# Patient Record
Sex: FEMALE | Race: WHITE | Employment: OTHER | URBAN - METROPOLITAN AREA
[De-identification: names, ages, dates, MRNs, and addresses within clinical notes are randomized per-mention and may not be internally consistent; named-entity substitution may affect disease eponyms.]

---

## 2020-09-28 ENCOUNTER — APPOINTMENT (OUTPATIENT)
Dept: GENERAL RADIOLOGY | Age: 60
DRG: 552 | End: 2020-09-28
Payer: COMMERCIAL

## 2020-09-28 ENCOUNTER — HOSPITAL ENCOUNTER (INPATIENT)
Age: 60
LOS: 2 days | Discharge: HOME | DRG: 552 | End: 2020-09-30
Attending: EMERGENCY MEDICINE | Admitting: INTERNAL MEDICINE
Payer: COMMERCIAL

## 2020-09-28 ENCOUNTER — APPOINTMENT (OUTPATIENT)
Dept: CT IMAGING | Age: 60
DRG: 552 | End: 2020-09-28
Payer: COMMERCIAL

## 2020-09-28 ENCOUNTER — APPOINTMENT (OUTPATIENT)
Dept: MRI IMAGING | Age: 60
DRG: 552 | End: 2020-09-28
Payer: COMMERCIAL

## 2020-09-28 PROBLEM — L03.311 CELLULITIS OF ABDOMINAL WALL: Status: ACTIVE | Noted: 2020-09-28
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
## 2020-09-28 PROBLEM — R25.1 TREMORS OF NERVOUS SYSTEM: Status: ACTIVE | Noted: 2020-09-28

## 2020-09-28 PROBLEM — E78.2 MIXED HYPERLIPIDEMIA: Status: ACTIVE | Noted: 2020-09-28

## 2020-09-28 PROBLEM — M19.90 ARTHRITIS: Status: ACTIVE | Noted: 2020-09-28

## 2020-09-28 PROBLEM — M43.00 SPONDYLOLYSIS: Status: ACTIVE | Noted: 2020-09-28

## 2020-09-28 LAB
A/G RATIO: 1.3 (ref 1.1–2.2)
ALBUMIN SERPL-MCNC: 4.3 G/DL (ref 3.4–5)
ALP BLD-CCNC: 94 U/L (ref 40–129)
ALT SERPL-CCNC: 33 U/L (ref 10–40)
ANION GAP SERPL CALCULATED.3IONS-SCNC: 10 MMOL/L (ref 3–16)
AST SERPL-CCNC: 27 U/L (ref 15–37)
BASOPHILS ABSOLUTE: 0 K/UL (ref 0–0.2)
BASOPHILS RELATIVE PERCENT: 0.8 %
BILIRUB SERPL-MCNC: 0.3 MG/DL (ref 0–1)
BUN BLDV-MCNC: 27 MG/DL (ref 7–20)
CALCIUM SERPL-MCNC: 9.5 MG/DL (ref 8.3–10.6)
CHLORIDE BLD-SCNC: 100 MMOL/L (ref 99–110)
CO2: 29 MMOL/L (ref 21–32)
CREAT SERPL-MCNC: 1 MG/DL (ref 0.6–1.1)
EKG ATRIAL RATE: 70 BPM
EKG DIAGNOSIS: NORMAL
EKG P AXIS: 50 DEGREES
EKG P-R INTERVAL: 182 MS
EKG Q-T INTERVAL: 410 MS
EKG QRS DURATION: 88 MS
EKG QTC CALCULATION (BAZETT): 442 MS
EKG R AXIS: 57 DEGREES
EKG T AXIS: 45 DEGREES
EKG VENTRICULAR RATE: 70 BPM
EOSINOPHILS ABSOLUTE: 0.4 K/UL (ref 0–0.6)
EOSINOPHILS RELATIVE PERCENT: 7.4 %
GFR AFRICAN AMERICAN: >60
GFR NON-AFRICAN AMERICAN: 57
GLOBULIN: 3.2 G/DL
GLUCOSE BLD-MCNC: 103 MG/DL (ref 70–99)
GLUCOSE BLD-MCNC: 142 MG/DL (ref 70–99)
HCT VFR BLD CALC: 41.3 % (ref 36–48)
HEMOGLOBIN: 13.5 G/DL (ref 12–16)
LYMPHOCYTES ABSOLUTE: 1.6 K/UL (ref 1–5.1)
LYMPHOCYTES RELATIVE PERCENT: 30.6 %
MCH RBC QN AUTO: 27.3 PG (ref 26–34)
MCHC RBC AUTO-ENTMCNC: 32.7 G/DL (ref 31–36)
MCV RBC AUTO: 83.5 FL (ref 80–100)
MONOCYTES ABSOLUTE: 0.4 K/UL (ref 0–1.3)
MONOCYTES RELATIVE PERCENT: 7 %
NEUTROPHILS ABSOLUTE: 2.9 K/UL (ref 1.7–7.7)
NEUTROPHILS RELATIVE PERCENT: 54.2 %
PDW BLD-RTO: 14 % (ref 12.4–15.4)
PERFORMED ON: ABNORMAL
PLATELET # BLD: 257 K/UL (ref 135–450)
PMV BLD AUTO: 7.9 FL (ref 5–10.5)
POTASSIUM REFLEX MAGNESIUM: 4.4 MMOL/L (ref 3.5–5.1)
RBC # BLD: 4.94 M/UL (ref 4–5.2)
SODIUM BLD-SCNC: 139 MMOL/L (ref 136–145)
TOTAL PROTEIN: 7.5 G/DL (ref 6.4–8.2)
WBC # BLD: 5.3 K/UL (ref 4–11)

## 2020-09-28 PROCEDURE — 6360000002 HC RX W HCPCS: Performed by: PHYSICIAN ASSISTANT

## 2020-09-28 PROCEDURE — 6370000000 HC RX 637 (ALT 250 FOR IP): Performed by: EMERGENCY MEDICINE

## 2020-09-28 PROCEDURE — 2580000003 HC RX 258: Performed by: NURSE PRACTITIONER

## 2020-09-28 PROCEDURE — 70450 CT HEAD/BRAIN W/O DYE: CPT

## 2020-09-28 PROCEDURE — 93005 ELECTROCARDIOGRAM TRACING: CPT | Performed by: EMERGENCY MEDICINE

## 2020-09-28 PROCEDURE — 72125 CT NECK SPINE W/O DYE: CPT

## 2020-09-28 PROCEDURE — 93010 ELECTROCARDIOGRAM REPORT: CPT | Performed by: INTERNAL MEDICINE

## 2020-09-28 PROCEDURE — 72141 MRI NECK SPINE W/O DYE: CPT

## 2020-09-28 PROCEDURE — 73030 X-RAY EXAM OF SHOULDER: CPT

## 2020-09-28 PROCEDURE — 96375 TX/PRO/DX INJ NEW DRUG ADDON: CPT

## 2020-09-28 PROCEDURE — 72128 CT CHEST SPINE W/O DYE: CPT

## 2020-09-28 PROCEDURE — 2500000003 HC RX 250 WO HCPCS: Performed by: NURSE PRACTITIONER

## 2020-09-28 PROCEDURE — 72131 CT LUMBAR SPINE W/O DYE: CPT

## 2020-09-28 PROCEDURE — 1200000000 HC SEMI PRIVATE

## 2020-09-28 PROCEDURE — 6360000002 HC RX W HCPCS: Performed by: NURSE PRACTITIONER

## 2020-09-28 PROCEDURE — 6370000000 HC RX 637 (ALT 250 FOR IP): Performed by: NURSE PRACTITIONER

## 2020-09-28 PROCEDURE — 85025 COMPLETE CBC W/AUTO DIFF WBC: CPT

## 2020-09-28 PROCEDURE — 72146 MRI CHEST SPINE W/O DYE: CPT

## 2020-09-28 PROCEDURE — 96374 THER/PROPH/DIAG INJ IV PUSH: CPT

## 2020-09-28 PROCEDURE — C9113 INJ PANTOPRAZOLE SODIUM, VIA: HCPCS | Performed by: PHYSICIAN ASSISTANT

## 2020-09-28 PROCEDURE — 99285 EMERGENCY DEPT VISIT HI MDM: CPT

## 2020-09-28 PROCEDURE — 6370000000 HC RX 637 (ALT 250 FOR IP): Performed by: PHYSICIAN ASSISTANT

## 2020-09-28 PROCEDURE — 80053 COMPREHEN METABOLIC PANEL: CPT

## 2020-09-28 RX ORDER — GABAPENTIN 300 MG/1
300 CAPSULE ORAL 2 TIMES DAILY
Status: DISCONTINUED | OUTPATIENT
Start: 2020-09-28 | End: 2020-09-30 | Stop reason: HOSPADM

## 2020-09-28 RX ORDER — PANTOPRAZOLE SODIUM 40 MG/10ML
40 INJECTION, POWDER, LYOPHILIZED, FOR SOLUTION INTRAVENOUS ONCE
Status: COMPLETED | OUTPATIENT
Start: 2020-09-28 | End: 2020-09-28

## 2020-09-28 RX ORDER — MORPHINE SULFATE 2 MG/ML
2 INJECTION, SOLUTION INTRAMUSCULAR; INTRAVENOUS EVERY 4 HOURS PRN
Status: DISCONTINUED | OUTPATIENT
Start: 2020-09-28 | End: 2020-09-30 | Stop reason: HOSPADM

## 2020-09-28 RX ORDER — ANASTROZOLE 1 MG/1
1 TABLET ORAL DAILY
Status: DISCONTINUED | OUTPATIENT
Start: 2020-09-29 | End: 2020-09-30 | Stop reason: HOSPADM

## 2020-09-28 RX ORDER — SODIUM CHLORIDE 0.9 % (FLUSH) 0.9 %
10 SYRINGE (ML) INJECTION PRN
Status: DISCONTINUED | OUTPATIENT
Start: 2020-09-28 | End: 2020-09-30 | Stop reason: HOSPADM

## 2020-09-28 RX ORDER — GABAPENTIN 300 MG/1
300 CAPSULE ORAL 2 TIMES DAILY
COMMUNITY
Start: 2020-05-05

## 2020-09-28 RX ORDER — ANASTROZOLE 1 MG/1
1 TABLET ORAL DAILY
COMMUNITY
Start: 2019-09-12

## 2020-09-28 RX ORDER — MORPHINE SULFATE 2 MG/ML
2 INJECTION, SOLUTION INTRAMUSCULAR; INTRAVENOUS ONCE
Status: COMPLETED | OUTPATIENT
Start: 2020-09-28 | End: 2020-09-28

## 2020-09-28 RX ORDER — DEXAMETHASONE SODIUM PHOSPHATE 4 MG/ML
4 INJECTION, SOLUTION INTRA-ARTICULAR; INTRALESIONAL; INTRAMUSCULAR; INTRAVENOUS; SOFT TISSUE EVERY 6 HOURS
Status: DISCONTINUED | OUTPATIENT
Start: 2020-09-29 | End: 2020-09-30 | Stop reason: HOSPADM

## 2020-09-28 RX ORDER — DULOXETIN HYDROCHLORIDE 60 MG/1
60 CAPSULE, DELAYED RELEASE ORAL DAILY
COMMUNITY

## 2020-09-28 RX ORDER — SULFAMETHOXAZOLE AND TRIMETHOPRIM 800; 160 MG/1; MG/1
1 TABLET ORAL EVERY 12 HOURS SCHEDULED
Status: DISCONTINUED | OUTPATIENT
Start: 2020-09-28 | End: 2020-09-29

## 2020-09-28 RX ORDER — PANTOPRAZOLE SODIUM 40 MG/1
40 TABLET, DELAYED RELEASE ORAL
Status: DISCONTINUED | OUTPATIENT
Start: 2020-09-29 | End: 2020-09-30 | Stop reason: HOSPADM

## 2020-09-28 RX ORDER — POLYETHYLENE GLYCOL 3350 17 G/17G
17 POWDER, FOR SOLUTION ORAL DAILY PRN
Status: DISCONTINUED | OUTPATIENT
Start: 2020-09-28 | End: 2020-09-30 | Stop reason: HOSPADM

## 2020-09-28 RX ORDER — ATORVASTATIN CALCIUM 20 MG/1
20 TABLET, FILM COATED ORAL DAILY
Status: DISCONTINUED | OUTPATIENT
Start: 2020-09-29 | End: 2020-09-30 | Stop reason: HOSPADM

## 2020-09-28 RX ORDER — PROMETHAZINE HYDROCHLORIDE 25 MG/1
12.5 TABLET ORAL EVERY 6 HOURS PRN
Status: DISCONTINUED | OUTPATIENT
Start: 2020-09-28 | End: 2020-09-30 | Stop reason: HOSPADM

## 2020-09-28 RX ORDER — DEXAMETHASONE SODIUM PHOSPHATE 4 MG/ML
4 INJECTION, SOLUTION INTRA-ARTICULAR; INTRALESIONAL; INTRAMUSCULAR; INTRAVENOUS; SOFT TISSUE ONCE
Status: COMPLETED | OUTPATIENT
Start: 2020-09-28 | End: 2020-09-28

## 2020-09-28 RX ORDER — SODIUM CHLORIDE 0.9 % (FLUSH) 0.9 %
10 SYRINGE (ML) INJECTION EVERY 12 HOURS SCHEDULED
Status: DISCONTINUED | OUTPATIENT
Start: 2020-09-28 | End: 2020-09-30 | Stop reason: HOSPADM

## 2020-09-28 RX ORDER — OXYCODONE HYDROCHLORIDE AND ACETAMINOPHEN 5; 325 MG/1; MG/1
1 TABLET ORAL ONCE
Status: COMPLETED | OUTPATIENT
Start: 2020-09-28 | End: 2020-09-28

## 2020-09-28 RX ORDER — PROPRANOLOL HYDROCHLORIDE 80 MG/1
80 TABLET ORAL DAILY
COMMUNITY

## 2020-09-28 RX ORDER — ACETAMINOPHEN 325 MG/1
650 TABLET ORAL EVERY 6 HOURS PRN
Status: DISCONTINUED | OUTPATIENT
Start: 2020-09-28 | End: 2020-09-30 | Stop reason: HOSPADM

## 2020-09-28 RX ORDER — ACETAMINOPHEN 650 MG/1
650 SUPPOSITORY RECTAL EVERY 6 HOURS PRN
Status: DISCONTINUED | OUTPATIENT
Start: 2020-09-28 | End: 2020-09-30 | Stop reason: HOSPADM

## 2020-09-28 RX ORDER — BUPROPION HYDROCHLORIDE 300 MG/1
300 TABLET ORAL DAILY
COMMUNITY
Start: 2020-09-22

## 2020-09-28 RX ORDER — NICOTINE POLACRILEX 4 MG/1
20 GUM, CHEWING ORAL DAILY
COMMUNITY
Start: 2020-09-15

## 2020-09-28 RX ORDER — ATORVASTATIN CALCIUM 20 MG/1
20 TABLET, FILM COATED ORAL DAILY
COMMUNITY
Start: 2020-06-10

## 2020-09-28 RX ORDER — DULOXETIN HYDROCHLORIDE 60 MG/1
60 CAPSULE, DELAYED RELEASE ORAL DAILY
Status: DISCONTINUED | OUTPATIENT
Start: 2020-09-29 | End: 2020-09-30 | Stop reason: HOSPADM

## 2020-09-28 RX ORDER — PROPRANOLOL HYDROCHLORIDE 40 MG/1
80 TABLET ORAL DAILY
Status: DISCONTINUED | OUTPATIENT
Start: 2020-09-29 | End: 2020-09-30 | Stop reason: HOSPADM

## 2020-09-28 RX ORDER — CELECOXIB 200 MG/1
200 CAPSULE ORAL DAILY
COMMUNITY
Start: 2020-06-25 | End: 2021-02-20

## 2020-09-28 RX ORDER — BUPROPION HYDROCHLORIDE 150 MG/1
300 TABLET ORAL DAILY
Status: DISCONTINUED | OUTPATIENT
Start: 2020-09-29 | End: 2020-09-30 | Stop reason: HOSPADM

## 2020-09-28 RX ORDER — ONDANSETRON 2 MG/ML
4 INJECTION INTRAMUSCULAR; INTRAVENOUS EVERY 6 HOURS PRN
Status: DISCONTINUED | OUTPATIENT
Start: 2020-09-28 | End: 2020-09-30 | Stop reason: HOSPADM

## 2020-09-28 RX ORDER — M-VIT,TX,IRON,MINS/CALC/FOLIC 27MG-0.4MG
1 TABLET ORAL DAILY
COMMUNITY

## 2020-09-28 RX ADMIN — GABAPENTIN 300 MG: 300 CAPSULE ORAL at 22:54

## 2020-09-28 RX ADMIN — INSULIN LISPRO 1 UNITS: 100 INJECTION, SOLUTION INTRAVENOUS; SUBCUTANEOUS at 22:55

## 2020-09-28 RX ADMIN — SODIUM CHLORIDE, PRESERVATIVE FREE 10 ML: 5 INJECTION INTRAVENOUS at 23:01

## 2020-09-28 RX ADMIN — OXYCODONE HYDROCHLORIDE AND ACETAMINOPHEN 1 TABLET: 5; 325 TABLET ORAL at 20:13

## 2020-09-28 RX ADMIN — SULFAMETHOXAZOLE AND TRIMETHOPRIM 1 TABLET: 800; 160 TABLET ORAL at 22:54

## 2020-09-28 RX ADMIN — PANTOPRAZOLE SODIUM 40 MG: 40 INJECTION, POWDER, FOR SOLUTION INTRAVENOUS at 18:52

## 2020-09-28 RX ADMIN — MORPHINE SULFATE 2 MG: 2 INJECTION, SOLUTION INTRAMUSCULAR; INTRAVENOUS at 22:55

## 2020-09-28 RX ADMIN — FAMOTIDINE 20 MG: 10 INJECTION, SOLUTION INTRAVENOUS at 22:54

## 2020-09-28 RX ADMIN — DEXAMETHASONE SODIUM PHOSPHATE 4 MG: 4 INJECTION, SOLUTION INTRAMUSCULAR; INTRAVENOUS at 18:51

## 2020-09-28 RX ADMIN — OXYCODONE HYDROCHLORIDE AND ACETAMINOPHEN 1 TABLET: 5; 325 TABLET ORAL at 18:51

## 2020-09-28 RX ADMIN — MORPHINE SULFATE 2 MG: 2 INJECTION, SOLUTION INTRAMUSCULAR; INTRAVENOUS at 21:18

## 2020-09-28 RX ADMIN — DEXAMETHASONE SODIUM PHOSPHATE 4 MG: 4 INJECTION, SOLUTION INTRAMUSCULAR; INTRAVENOUS at 22:54

## 2020-09-28 ASSESSMENT — ENCOUNTER SYMPTOMS
ABDOMINAL PAIN: 0
COLOR CHANGE: 1
NAUSEA: 0
COUGH: 0
SHORTNESS OF BREATH: 0
VOMITING: 0

## 2020-09-28 ASSESSMENT — PAIN DESCRIPTION - PROGRESSION: CLINICAL_PROGRESSION: NOT CHANGED

## 2020-09-28 ASSESSMENT — PAIN - FUNCTIONAL ASSESSMENT: PAIN_FUNCTIONAL_ASSESSMENT: PREVENTS OR INTERFERES SOME ACTIVE ACTIVITIES AND ADLS

## 2020-09-28 ASSESSMENT — PAIN DESCRIPTION - ONSET: ONSET: GRADUAL

## 2020-09-28 ASSESSMENT — PAIN SCALES - GENERAL
PAINLEVEL_OUTOF10: 0
PAINLEVEL_OUTOF10: 5
PAINLEVEL_OUTOF10: 4
PAINLEVEL_OUTOF10: 7
PAINLEVEL_OUTOF10: 1
PAINLEVEL_OUTOF10: 5
PAINLEVEL_OUTOF10: 7

## 2020-09-28 ASSESSMENT — PAIN DESCRIPTION - LOCATION: LOCATION: BACK;NECK

## 2020-09-28 ASSESSMENT — PAIN DESCRIPTION - DESCRIPTORS: DESCRIPTORS: ACHING

## 2020-09-28 ASSESSMENT — PAIN DESCRIPTION - FREQUENCY: FREQUENCY: CONTINUOUS

## 2020-09-28 ASSESSMENT — PAIN DESCRIPTION - PAIN TYPE: TYPE: ACUTE PAIN

## 2020-09-28 NOTE — ED PROVIDER NOTES
Attending Supervisory Note/Shared Visit   I have personally performed a face to face diagnostic evaluation on this patient. I have reviewed the mid-levels findings and agree. History and Exam by me shows white female no acute distress. She states 8 days ago, last Sunday, she fell backwards out of her camper. She states she thinks she landed on her buttocks first.  She does not think she hit her head. She states she is really not having any pain, but the next day she noticed some numbness in her right hand and since then her symptoms got progressively worse involving her right arm and right leg. She states her right leg just does not feel right, she is having some trouble walking. She is right-hand dominant. She is having trouble gripping things and picking things up. States her right arm feels weak and she has some numbness. HEENT: Head is atraumatic. Pupils equal round reactive. Extraocular movements are intact. No facial asymmetry. Heart: Regular rate and rhythm. No murmurs or gallops noted. Lungs: Breath sounds equal bilaterally and clear. Abdomen: Soft, nondistended, nontender. Neuro: Awake, alert, oriented. Symmetrical reactive pupils. Intact extraocular movements. No facial asymmetry. Asymmetrical  strength, weakness on the right. Asymmetrical step strength, weaker on the right. No detectable weakness in the lower extremities, no asymmetry. 1+ symmetrical biceps and triceps reflexes in the upper extremities. 1+ brachial radialis on the left, 0 on the right. 2+ symmetrical patellar tendon reflexes in the lower extremities. 1+ symmetrical Achilles tendon reflexes in the lower extremities. Reviewed. H&H is normal.  White blood cell count 5300 with 54 neutrophils and 31 lymphs. Electrolytes normal.  BUN of 27 with a creatinine 1.0.  Glucose of 103. Right shoulder x-ray: Right shoulder arthroplasty, no acute abnormality.     CT thoracic spine: No acute traumatic abnormality identified. Moderate chronic scoliosis with spinal clemente in place. CT cervical spine: No acute fracture. CT lumbar spine: Degenerative changes without acute abnormality. CT head: No acute intracranial abnormality. EKG: Normal sinus rhythm, rate of 70, nonspecific ST changes. Rhythm strip shows a sinus rhythm with a rate of 70, IA interval of 182 ms, QRS of 88 ms with no other ectopy as interpreted by me. No old EKG available for comparison. MRI thoracic spine: No acute fracture of the thoracic spine evident. 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 Dextroconvex thoracic scoliosis. Mild multilevel spinal stenosis within the upper thoracic spine secondary to multiple disc bulges. MRI of the cervical spine: Multiple cervical segmentation and fusion abnormalities. Severe multilevel degenerative changes most pronounced at C4-5 where there is moderate spinal canal stenosis and moderate bilateral neural foraminal narrowing. There is abnormal increased T2 signal intensity within the cervical spinal cord suggesting spondylitic myelopathy. A consult has been placed to neurosurgery. Dr. Maia Gary was consulted. He will see the patient. The hospitalist was consulted for admission.         (Please note that portions of this note were completed with a voice recognition program.  Efforts were made to edit the dictations but occasionally words are mis-transcribed.)    Roberto Holbrook MD  Attending Emergency Physician        Josesito Ball MD  09/28/20 [id number]

## 2020-09-28 NOTE — ED NOTES
Epic will not open in the room due hospital wide system down so meds signed off at  Nurse station.       Cyn Almazan RN  09/28/20 [id number]

## 2020-09-28 NOTE — ED PROVIDER NOTES
HPI.    REVIEW OF SYSTEMS    (2-9 systems for level 4, 10 or more for level 5)     Review of Systems   Constitutional: Negative for chills and fever. Respiratory: Negative for cough and shortness of breath. Cardiovascular: Negative for chest pain and palpitations. Gastrointestinal: Negative for abdominal pain, nausea and vomiting. Genitourinary: Negative for decreased urine volume, difficulty urinating, dysuria, frequency and urgency. Skin: Positive for color change (tx for MRSA on abdomen last week - finished antibiotics and reports looking better). Neurological: Positive for weakness (RUE, RLE) and numbness (RUE, RLE). Negative for dizziness and light-headedness. Psychiatric/Behavioral: Negative for agitation and confusion. Positives and Pertinent negatives as per HPI. Except as noted above in the ROS, all other systems were reviewed and negative. PAST MEDICAL HISTORY     Past Medical History:   Diagnosis Date    Arthritis     Cancer ([address]     H/O total shoulder replacement, right     Kidney stone          SURGICAL HISTORY     Past Surgical History:   Procedure Laterality Date    CARPAL TUNNEL RELEASE      CHOLECYSTECTOMY      JOINT REPLACEMENT      JOINT REPLACEMENT      bilateral         CURRENTMEDICATIONS       Previous Medications    No medications on file         ALLERGIES     Patient has no allergy information on record. FAMILYHISTORY     History reviewed. No pertinent family history.        SOCIAL HISTORY       Social History     Tobacco Use    Smoking status: Never Smoker    Smokeless tobacco: Never Used   Substance Use Topics    Alcohol use: Yes     Comment: occ    Drug use: Never       SCREENINGS             PHYSICAL EXAM    (up to 7 for level 4, 8 or more for level 5)     ED Triage Vitals   BP Temp Temp Source Pulse Resp SpO2 Height Weight   09/28/20 1001 09/28/20 1001 09/28/20 1001 09/28/20 1001 09/28/20 1001 09/28/20 1001 09/28/20 1011 09/28/20 1011   135/87 98.3 °F (36.8 °C) Oral 68 16 100 % 5' (1.524 m) 188 lb 4.4 oz (85.4 kg)       Physical Exam  Vitals signs and nursing note reviewed. Constitutional:       General: She is not in acute distress. Appearance: Normal appearance. She is well-developed. She is not ill-appearing, toxic-appearing or diaphoretic. HENT:      Head: Normocephalic and atraumatic. Eyes:      Conjunctiva/sclera: Conjunctivae normal.      Pupils: Pupils are equal, round, and reactive to light. Cardiovascular:      Rate and Rhythm: Normal rate and regular rhythm. Pulmonary:      Effort: Pulmonary effort is normal. No respiratory distress. Abdominal:      General: Bowel sounds are normal. There is no distension. Palpations: Abdomen is soft. Tenderness: There is no abdominal tenderness. Comments: Area of redness to right/mid abdominal area, no fluctuance or induration, pt reports significant improvement as compared to previous   Musculoskeletal:      Right shoulder: She exhibits decreased range of motion. Left shoulder: Normal.      Right hip: She exhibits decreased strength (slight, as compared to left). She exhibits normal range of motion. Left hip: Normal.      Right hand: She exhibits normal capillary refill and no deformity. Decreased strength (as compared to left, decreased  strength) noted. Left hand: Normal.      Right lower leg: No edema. Left lower leg: No edema. Skin:     General: Skin is warm and dry. Neurological:      Mental Status: She is alert and oriented to person, place, and time. GCS: GCS eye subscore is 4. GCS verbal subscore is 5. GCS motor subscore is 6. Cranial Nerves: Cranial nerves are intact. Motor: Weakness (slight right weakness to RUE, RLE as compared to left) present. No tremor or abnormal muscle tone.       Coordination: Coordination normal. Finger-Nose-Finger Test normal.      Deep Tendon Reflexes:      Reflex Scores:       Brachioradialis reflexes are 1+ on the right side and 2+ on the left side. Patellar reflexes are 1+ on the right side and 2+ on the left side. Psychiatric:         Behavior: Behavior normal. Behavior is cooperative. Thought Content: Thought content normal.         DIAGNOSTIC RESULTS   LABS:    Labs Reviewed   COMPREHENSIVE METABOLIC PANEL W/ REFLEX TO MG FOR LOW K - Abnormal; Notable for the following components:       Result Value    Glucose 103 (*)     BUN 27 (*)     GFR Non- 57 (*)     All other components within normal limits    Narrative:     Performed at:  [facility]  [address] [place], [place] [address]   Phone [phone]   CBC WITH AUTO DIFFERENTIAL    Narrative:     Performed at:  [facility]  [address] [place], [place] [address]   Phone [phone]       All other labs were within normal range or not returned as of this dictation. EKG: All EKG's are interpreted by the Emergency Department Physician in the absence of a cardiologist.  Please see their note for interpretation of EKG. RADIOLOGY:   Non-plain film images such as CT, Ultrasound and MRI are read by the radiologist. Plain radiographic images are visualized and preliminarily interpreted by the ED Provider with the below findings:        Interpretation per the Radiologist below, if available at the time of this note:    MRI THORACIC SPINE WO CONTRAST   Preliminary Result   1. No acute fracture of the thoracic spine evident. 2. 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 3. Dextroconvex thoracic scoliosis. 4. Mild multilevel spinal canal stenosis within the upper thoracic spine   secondary to multiple disc bulges. MRI CERVICAL SPINE WO CONTRAST   Final Result   Multiple cervical segmentation and fusion anomalies suggesting Klippel-Feil   spectrum.       Severe multilevel degenerative changes, most pronounced at C4-C5 where there   is moderate spinal canal stenosis and moderate bilateral neural foraminal   narrowing, as described above. There is abnormal increased T2 signal   intensity within the cervical spinal cord suggesting spondylitic myelopathy. Mild spinal canal stenosis at C3-C4, C5-C6 and C6-C7 as described above. Multilevel neural foraminal narrowing, most severe on the left at C6-C7. XR SHOULDER RIGHT (MIN 2 VIEWS)   Final Result   Satisfactory alignment, right shoulder arthroplasty. CT THORACIC SPINE WO CONTRAST   Final Result   No acute traumatic abnormality identified. Moderate chronic scoliosis with   spinal clemente in place. CT CERVICAL SPINE WO CONTRAST   Final Result   1. No acute fracture. CT LUMBAR SPINE WO CONTRAST   Final Result   Degenerative changes without acute abnormality. Nonobstructing bilateral nephrolithiasis. CT HEAD WO CONTRAST   Final Result   No acute intracranial abnormality. Mild paranasal sinus disease. Xr Shoulder Right (min 2 Views)    Result Date: 9/28/2020  EXAMINATION: THREE XRAY VIEWS OF THE RIGHT SHOULDER 9/28/2020 10:55 am COMPARISON: None. HISTORY: ORDERING SYSTEM PROVIDED HISTORY: Injury TECHNOLOGIST PROVIDED HISTORY: Reason for exam:->Injury Reason for Exam: right shoulder injury, hx of replacement Acuity: Acute Type of Exam: Initial FINDINGS: Total right shoulder replacement is evident. The metallic components are well positioned with normal alignment. There is no acute fracture. Soft tissue changes are seen from surgery. Satisfactory alignment, right shoulder arthroplasty.      Ct Head Wo Contrast    Result Date: 9/28/2020  EXAMINATION: CT OF THE HEAD WITHOUT CONTRAST  9/28/2020 10:41 am TECHNIQUE: CT of the head was performed without the administration of intravenous contrast. Dose modulation, iterative reconstruction, and/or weight based adjustment of the mA/kV was utilized to reduce the radiation dose to as low as reasonably achievable. COMPARISON: None. HISTORY: ORDERING SYSTEM PROVIDED HISTORY: weakness, trauma TECHNOLOGIST PROVIDED HISTORY: If patient is on cardiac monitor and/or pulse ox, they may be taken off cardiac monitor and pulse ox, left on O2 if currently on. All monitors reattached when patient returns to room. Reason for exam:->weakness, trauma Reason for exam:->no Has a \"code stroke\" or \"stroke alert\" been called? ->No Reason for Exam: fall 1 week ago, new onset weakness RUE and RLE since fall Acuity: Acute Type of Exam: Initial FINDINGS: BRAIN/VENTRICLES: There is no acute intracranial hemorrhage, mass effect or midline shift. No abnormal extra-axial fluid collection. The gray-white differentiation is maintained without evidence of an acute infarct. There is no evidence of hydrocephalus. ORBITS: The visualized portion of the orbits demonstrate no acute abnormality. SINUSES: Small air-fluid levels noted within the left maxillary sinus. Minimal mucosal thickening on the right maxillary sinus. No fractures identified. Mastoid air cells appear clear. SOFT TISSUES/SKULL:  No acute abnormality of the visualized skull or soft tissues. No acute intracranial abnormality. Mild paranasal sinus disease. Ct Cervical Spine Wo Contrast    Result Date: 9/28/2020  EXAMINATION: CT OF THE CERVICAL SPINE WITHOUT CONTRAST 9/28/2020 10:15 am: TECHNIQUE: CT of the cervical spine was performed without the administration of intravenous contrast. Multiplanar reformatted images are provided for review. Dose modulation, iterative reconstruction, and/or weight based adjustment of the mA/kV was utilized to reduce the radiation dose to as low as reasonably achievable. COMPARISON: None available.  HISTORY: ORDERING SYSTEM PROVIDED HISTORY: fall 1 week ago, new onset weakness RUE and RLE since fall TECHNOLOGIST PROVIDED HISTORY: If patient is on cardiac monitor and/or pulse ox, they may be taken off cardiac monitor and pulse ox, left on O2 if currently on. All monitors reattached when patient returns to room. Reason for exam:->fall 1 week ago, new onset weakness RUE and RLE since fall Reason for Exam: fall 1 week ago, new onset weakness RUE and RLE since fall Acuity: Acute Type of Exam: Initial FINDINGS: BONES/ALIGNMENT: There is no acute fracture. The 7 cervical vertebral bodies are in anatomic alignment. 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 The craniocervical junction is intact. Incidental note is made of a right cervical rib fused with the 1st rib. DEGENERATIVE CHANGES: There is moderate to severe multilevel spondylosis and facet arthropathy. SOFT TISSUES: There is no prevertebral soft tissue swelling. 1.  No acute fracture. Ct Thoracic Spine Wo Contrast    Result Date: 9/28/2020  EXAMINATION: CT OF THE THORACIC SPINE WITHOUT CONTRAST  9/28/2020 10:52 am: TECHNIQUE: CT of the thoracic spine was performed without the administration of intravenous contrast. Multiplanar reformatted images are provided for review. Dose modulation, iterative reconstruction, and/or weight based adjustment of the mA/kV was utilized to reduce the radiation dose to as low as reasonably achievable. COMPARISON: None. HISTORY: ORDERING SYSTEM PROVIDED HISTORY: pain, trauma, fall 1 week ago, new onset weakness RUE, RLE since fall TECHNOLOGIST PROVIDED HISTORY: Reason for exam:->pain, trauma, fall 1 week ago, new onset weakness RUE, RLE since fall Reason for Exam: pain, trauma, fall 1 week ago, new onset weakness RUE, RLE since fall Acuity: Acute Type of Exam: Initial FINDINGS: BONES/ALIGNMENT: Prominent scoliosis with concavity to the left. Single spinal clemente in place extending from T8 to T12. Intact hardware. .  The vertebral body heights are maintained. No osseous destructive lesion is seen. Intact posterior bony fusion between T8 and T12.  DEGENERATIVE CHANGES: No gross spinal canal stenosis or bony neural foraminal narrowing of the thoracic spine. Severe interspace narrowing present at several levels in the upper half of the thoracic spine and also at T12-L1. SOFT TISSUES: No paraspinal mass is seen. No epidural hematoma. No acute traumatic abnormality identified. Moderate chronic scoliosis with spinal clemente in place. Ct Lumbar Spine Wo Contrast    Result Date: 9/28/2020  EXAMINATION: CT OF THE LUMBAR SPINE WITHOUT CONTRAST  9/28/2020 TECHNIQUE: CT of the lumbar spine was performed without the administration of intravenous contrast. Multiplanar reformatted images are provided for review. Dose modulation, iterative reconstruction, and/or weight based adjustment of the mA/kV was utilized to reduce the radiation dose to as low as reasonably achievable. COMPARISON: None HISTORY: ORDERING SYSTEM PROVIDED HISTORY: FALLING TECHNOLOGIST PROVIDED HISTORY: Reason for exam:->fall 1 week ago, new onset weakness RUE and RLE since fall Reason for Exam: fall 1 week ago, new onset weakness RUE and RLE since fall Acuity: Acute Type of Exam: Initial FINDINGS: BONES/ALIGNMENT: Partially imaged Johnson clmeente is locked in within the T11/T12 right interlaminar space. There is no acute fracture or traumatic subluxation. There is no osseous destruction. There is approximately 3 mm retrolisthesis of L1 on L2, 4 mm retrolisthesis of L3 on L4 and 3 mm anterolisthesis of L4 on L5 on a chronic degenerative basis. DEGENERATIVE CHANGES: There is moderate to severe disc space narrowing and endplate osteophyte formation from the L1/L2 down through the L4/L5 levels. SOFT TISSUES/RETROPERITONEUM: Incidental note is made of a nonobstructing 8 mm intrarenal calculus and a 2 mm nonobstructing right intrarenal calculus. Degenerative changes without acute abnormality. Nonobstructing bilateral nephrolithiasis.      Mri Cervical Spine Wo Contrast    Result Date: 9/28/2020  EXAMINATION: MRI OF THE CERVICAL SPINE WITHOUT CONTRAST supervising physician Astrid Doshi MD.  -  Differential diagnosis includes: stroke, TIA, intracranial bleed, trauma, infection/sepsis, medication side effect, intoxication/withdrawal, metabolic/electrolyte abnormalities  -  Work-up included:  See above  - Consults: Neuro/Spine - I spoke with Dr. Andrew Holland who advised that the patient should be admitted with decadron 4 mg every 6 hours, be started on protonix, and have sliding scale insulin orders placed. He will plan to see her inpatient tomorrow and discuss whether or not she requires surgical intervention on Wednesday.   -  Results discussed with patient. Labs show no leukocytosis or concerning anemia, metabolic panel with no concerning abnormalities at this time. Imaging studies show no acute process on CT of the head or cervical spine. There is no acute abnormality on CT of the thoracic or lumbar spine. Given her new neurological deficits, MRI studies are ordered of the cervical and thoracic spine. Within the cervical spine, there are multiple cervical segmentation fusion anomalies, and abnormal T2 signal intensity within the cervical spinal cord that is suggestive of spondylitic myelopathy at C4-C5. There is no acute fracture of the thoracic spine, no acute fracture of the cervical spine. In the thoracic spine, there are multiple segmentation anomalies and sequela of unilateral Johnson clemente fixation and multiple levels of spinal canal stenosis within the upper thoracic spine that secondary to multiple disc bulges. At this time, we recommend admission, as the patient will require further evaluation and management by neurosurgery. The patient is agreeable with plan of care and disposition.  -  Disposition:  Admission    FINAL IMPRESSION      1. Myelopathy ([address]    2. Spinal stenosis of cervicothoracic region    3. Paresthesia of right arm and leg    4.  Fall, initial encounter          DISPOSITION/PLAN   DISPOSITION        PATIENT REFERREDTO:  No follow-up provider specified.     DISCHARGE MEDICATIONS:  New Prescriptions    No medications on file       DISCONTINUED MEDICATIONS:  Discontinued Medications    No medications on file              (Please note that portions of this note were completed with a voice recognition program.  Efforts were made to edit the dictations but occasionally words are mis-transcribed.)    OLVIN Bryant (electronically signed)           Ritu Chu [place]  09/28/20 [id number]

## 2020-09-29 PROBLEM — C50.919 BREAST CANCER (HCC): Status: ACTIVE | Noted: 2020-09-29

## 2020-09-29 PROBLEM — E86.0 DEHYDRATION: Status: ACTIVE | Noted: 2020-09-29

## 2020-09-29 LAB
A/G RATIO: 1.4 (ref 1.1–2.2)
ALBUMIN SERPL-MCNC: 4.1 G/DL (ref 3.4–5)
ALP BLD-CCNC: 83 U/L (ref 40–129)
ALT SERPL-CCNC: 30 U/L (ref 10–40)
ANION GAP SERPL CALCULATED.3IONS-SCNC: 12 MMOL/L (ref 3–16)
AST SERPL-CCNC: 23 U/L (ref 15–37)
BILIRUB SERPL-MCNC: <0.2 MG/DL (ref 0–1)
BUN BLDV-MCNC: 32 MG/DL (ref 7–20)
CALCIUM SERPL-MCNC: 9.5 MG/DL (ref 8.3–10.6)
CHLORIDE BLD-SCNC: 101 MMOL/L (ref 99–110)
CO2: 25 MMOL/L (ref 21–32)
CREAT SERPL-MCNC: 1.1 MG/DL (ref 0.6–1.1)
GFR AFRICAN AMERICAN: >60
GFR NON-AFRICAN AMERICAN: 51
GLOBULIN: 2.9 G/DL
GLUCOSE BLD-MCNC: 125 MG/DL (ref 70–99)
GLUCOSE BLD-MCNC: 147 MG/DL (ref 70–99)
GLUCOSE BLD-MCNC: 151 MG/DL (ref 70–99)
GLUCOSE BLD-MCNC: 163 MG/DL (ref 70–99)
GLUCOSE BLD-MCNC: 195 MG/DL (ref 70–99)
HCT VFR BLD CALC: 38.5 % (ref 36–48)
HEMOGLOBIN: 12.9 G/DL (ref 12–16)
MCH RBC QN AUTO: 27.7 PG (ref 26–34)
MCHC RBC AUTO-ENTMCNC: 33.3 G/DL (ref 31–36)
MCV RBC AUTO: 83.1 FL (ref 80–100)
PDW BLD-RTO: 14 % (ref 12.4–15.4)
PERFORMED ON: ABNORMAL
PLATELET # BLD: 244 K/UL (ref 135–450)
PMV BLD AUTO: 7.9 FL (ref 5–10.5)
POTASSIUM REFLEX MAGNESIUM: 4.8 MMOL/L (ref 3.5–5.1)
RBC # BLD: 4.64 M/UL (ref 4–5.2)
SODIUM BLD-SCNC: 138 MMOL/L (ref 136–145)
TOTAL PROTEIN: 7 G/DL (ref 6.4–8.2)
WBC # BLD: 6.4 K/UL (ref 4–11)

## 2020-09-29 PROCEDURE — 2580000003 HC RX 258: Performed by: NURSE PRACTITIONER

## 2020-09-29 PROCEDURE — 97530 THERAPEUTIC ACTIVITIES: CPT

## 2020-09-29 PROCEDURE — 85027 COMPLETE CBC AUTOMATED: CPT

## 2020-09-29 PROCEDURE — 80053 COMPREHEN METABOLIC PANEL: CPT

## 2020-09-29 PROCEDURE — 6360000002 HC RX W HCPCS: Performed by: FAMILY MEDICINE

## 2020-09-29 PROCEDURE — 2580000003 HC RX 258: Performed by: FAMILY MEDICINE

## 2020-09-29 PROCEDURE — 1200000000 HC SEMI PRIVATE

## 2020-09-29 PROCEDURE — 6370000000 HC RX 637 (ALT 250 FOR IP): Performed by: NURSE PRACTITIONER

## 2020-09-29 PROCEDURE — 6360000002 HC RX W HCPCS: Performed by: NURSE PRACTITIONER

## 2020-09-29 PROCEDURE — 97162 PT EVAL MOD COMPLEX 30 MIN: CPT

## 2020-09-29 PROCEDURE — 36415 COLL VENOUS BLD VENIPUNCTURE: CPT

## 2020-09-29 RX ORDER — NICOTINE POLACRILEX 4 MG
15 LOZENGE BUCCAL PRN
Status: DISCONTINUED | OUTPATIENT
Start: 2020-09-29 | End: 2020-09-30 | Stop reason: HOSPADM

## 2020-09-29 RX ORDER — DEXTROSE MONOHYDRATE 25 G/50ML
12.5 INJECTION, SOLUTION INTRAVENOUS PRN
Status: DISCONTINUED | OUTPATIENT
Start: 2020-09-29 | End: 2020-09-30 | Stop reason: HOSPADM

## 2020-09-29 RX ORDER — LANOLIN ALCOHOL/MO/W.PET/CERES
5 CREAM (GRAM) TOPICAL NIGHTLY PRN
Status: DISCONTINUED | OUTPATIENT
Start: 2020-09-29 | End: 2020-09-30 | Stop reason: HOSPADM

## 2020-09-29 RX ORDER — DEXTROSE MONOHYDRATE 50 MG/ML
100 INJECTION, SOLUTION INTRAVENOUS PRN
Status: DISCONTINUED | OUTPATIENT
Start: 2020-09-29 | End: 2020-09-30 | Stop reason: HOSPADM

## 2020-09-29 RX ADMIN — ACETAMINOPHEN 650 MG: 325 TABLET ORAL at 20:52

## 2020-09-29 RX ADMIN — PROPRANOLOL HYDROCHLORIDE 80 MG: 40 TABLET ORAL at 08:05

## 2020-09-29 RX ADMIN — DEXAMETHASONE SODIUM PHOSPHATE 4 MG: 4 INJECTION, SOLUTION INTRAMUSCULAR; INTRAVENOUS at 11:46

## 2020-09-29 RX ADMIN — Medication 10 ML: at 20:50

## 2020-09-29 RX ADMIN — INSULIN LISPRO 2 UNITS: 100 INJECTION, SOLUTION INTRAVENOUS; SUBCUTANEOUS at 11:46

## 2020-09-29 RX ADMIN — DEXAMETHASONE SODIUM PHOSPHATE 4 MG: 4 INJECTION, SOLUTION INTRAMUSCULAR; INTRAVENOUS at 17:46

## 2020-09-29 RX ADMIN — ATORVASTATIN CALCIUM 20 MG: 20 TABLET, FILM COATED ORAL at 08:06

## 2020-09-29 RX ADMIN — ANASTROZOLE 1 MG: 1 TABLET ORAL at 09:08

## 2020-09-29 RX ADMIN — GABAPENTIN 300 MG: 300 CAPSULE ORAL at 08:06

## 2020-09-29 RX ADMIN — SODIUM CHLORIDE, PRESERVATIVE FREE 10 ML: 5 INJECTION INTRAVENOUS at 22:18

## 2020-09-29 RX ADMIN — INSULIN LISPRO 1 UNITS: 100 INJECTION, SOLUTION INTRAVENOUS; SUBCUTANEOUS at 20:53

## 2020-09-29 RX ADMIN — INSULIN LISPRO 2 UNITS: 100 INJECTION, SOLUTION INTRAVENOUS; SUBCUTANEOUS at 08:04

## 2020-09-29 RX ADMIN — SODIUM CHLORIDE, PRESERVATIVE FREE 10 ML: 5 INJECTION INTRAVENOUS at 08:07

## 2020-09-29 RX ADMIN — BUPROPION HYDROCHLORIDE 300 MG: 150 TABLET, EXTENDED RELEASE ORAL at 08:06

## 2020-09-29 RX ADMIN — DULOXETINE HYDROCHLORIDE 60 MG: 60 CAPSULE, DELAYED RELEASE ORAL at 08:06

## 2020-09-29 RX ADMIN — ACETAMINOPHEN 650 MG: 325 TABLET ORAL at 15:05

## 2020-09-29 RX ADMIN — DEXAMETHASONE SODIUM PHOSPHATE 4 MG: 4 INJECTION, SOLUTION INTRAMUSCULAR; INTRAVENOUS at 05:46

## 2020-09-29 RX ADMIN — GABAPENTIN 300 MG: 300 CAPSULE ORAL at 20:50

## 2020-09-29 RX ADMIN — Medication 4.5 MG: at 00:47

## 2020-09-29 RX ADMIN — PANTOPRAZOLE SODIUM 40 MG: 40 TABLET, DELAYED RELEASE ORAL at 05:46

## 2020-09-29 RX ADMIN — SULFAMETHOXAZOLE AND TRIMETHOPRIM 1 TABLET: 800; 160 TABLET ORAL at 08:06

## 2020-09-29 RX ADMIN — MORPHINE SULFATE 2 MG: 2 INJECTION, SOLUTION INTRAMUSCULAR; INTRAVENOUS at 03:22

## 2020-09-29 RX ADMIN — VANCOMYCIN HYDROCHLORIDE 1500 MG: 10 INJECTION, POWDER, LYOPHILIZED, FOR SOLUTION INTRAVENOUS at 13:15

## 2020-09-29 RX ADMIN — ENOXAPARIN SODIUM 40 MG: 40 INJECTION SUBCUTANEOUS at 08:05

## 2020-09-29 ASSESSMENT — PAIN DESCRIPTION - DESCRIPTORS
DESCRIPTORS: ACHING

## 2020-09-29 ASSESSMENT — PAIN DESCRIPTION - PAIN TYPE
TYPE: ACUTE PAIN

## 2020-09-29 ASSESSMENT — PAIN DESCRIPTION - FREQUENCY
FREQUENCY: INTERMITTENT
FREQUENCY: CONTINUOUS

## 2020-09-29 ASSESSMENT — PAIN DESCRIPTION - PROGRESSION
CLINICAL_PROGRESSION: GRADUALLY IMPROVING
CLINICAL_PROGRESSION: NOT CHANGED
CLINICAL_PROGRESSION: GRADUALLY WORSENING
CLINICAL_PROGRESSION: NOT CHANGED

## 2020-09-29 ASSESSMENT — PAIN DESCRIPTION - ONSET
ONSET: ON-GOING
ONSET: GRADUAL
ONSET: GRADUAL
ONSET: ON-GOING
ONSET: GRADUAL

## 2020-09-29 ASSESSMENT — PAIN SCALES - GENERAL
PAINLEVEL_OUTOF10: 8
PAINLEVEL_OUTOF10: 0
PAINLEVEL_OUTOF10: 4
PAINLEVEL_OUTOF10: 7
PAINLEVEL_OUTOF10: 0
PAINLEVEL_OUTOF10: 10
PAINLEVEL_OUTOF10: 0
PAINLEVEL_OUTOF10: 3

## 2020-09-29 ASSESSMENT — PAIN - FUNCTIONAL ASSESSMENT
PAIN_FUNCTIONAL_ASSESSMENT: PREVENTS OR INTERFERES SOME ACTIVE ACTIVITIES AND ADLS

## 2020-09-29 ASSESSMENT — PAIN DESCRIPTION - LOCATION
LOCATION: NECK
LOCATION: HEAD

## 2020-09-29 ASSESSMENT — PAIN DESCRIPTION - ORIENTATION
ORIENTATION: MID
ORIENTATION: MID

## 2020-09-29 NOTE — PROGRESS NOTES
Patient informed she is on strict bedrest. Patient stated she would not use a bed pan or a purewick. Patient educated on the importance of remaining on strict bedrest but patient is refusing to follow orders. Bed alarm is set and patient instructed to call before getting out of bed.     Electronically signed by Nirav Fry RN on 9/28/2020 at 10:20 PM

## 2020-09-29 NOTE — PROGRESS NOTES
Physical Therapy    Facility/Department: [address] ORTHOPEDICS  Initial Assessment    NAME: Claribel Can  : 1960  MRN: [id number]    Date of Service: 2020    Discharge Recommendations:  Home independently     Claribel Can scored a 24/24 on the AM-PAC short mobility form. At this time, no further PT is recommended upon discharge due to baseline mobility. Recommend patient returns to prior setting with prior services. Assessment   Assessment: Pt is a 63 y.o. female who presented to the ED on 20 with R arm and leg numbness and weakness. She reports this started ~ 1 week ago after falling backwards out of camper. Patient currently functioning very near to baseline. Recommend return home independently. Deferred multiple questions about possibility of C-collar postoperatively to the surgeon and his team.  Prognosis: Good  Decision Making: Medium Complexity  History: see below  Exam: see below  Clinical Presentation: evolving  PT Education: PT Role;Plan of Care;General Safety  REQUIRES PT FOLLOW UP: Yes  Activity Tolerance  Activity Tolerance: Patient Tolerated treatment well       Patient Diagnosis(es): The primary encounter diagnosis was Myelopathy ([address] Diagnoses of Spinal stenosis of cervicothoracic region, Paresthesia of right arm and leg, and Fall, initial encounter were also pertinent to this visit. has a past medical history of Arthritis, Cancer ([address] H/O total shoulder replacement, right, and Kidney stone. has a past surgical history that includes Cholecystectomy; Carpal tunnel release; joint replacement; and joint replacement.     Restrictions  Restrictions/Precautions  Restrictions/Precautions: Up as Tolerated     Vision/Hearing  Vision: Impaired  Vision Exceptions: Wears glasses at all times  Hearing: Within functional limits       Subjective  General  Chart Reviewed: Yes  Patient assessed for rehabilitation services?: Yes  Additional Pertinent Hx: Pt is a 63 y.o. female who presented to the ED on 9/29/20 with R arm and leg numbness and weakness. She reports this started ~ 1 week ago after falling backwards out of camper. Response To Previous Treatment: Not applicable  Family / Caregiver Present: No  Referring Practitioner: Dr. Malcom Vang  Referral Date : 09/29/20  Diagnosis: C4-5 stenosis  Follows Commands: Within Functional Limits  Subjective  Subjective: Pt is agreeable to PT - pt reporting elimination of numbness except for dorsum of hand after ambulating ~50'. Pain Screening  Patient Currently in Pain: Denies          Orientation  Orientation  Overall Orientation Status: Within Functional Limits     Social/Functional History  Social/Functional History  Lives With: Spouse(son and DIL)  Type of Home: House  Home Layout: Able to Live on Main level with bedroom/bathroom, One level  Home Access: Stairs to enter without rails  Entrance Stairs - Number of Steps: 1  Bathroom Shower/Tub: Walk-in shower  Bathroom Toilet: Handicap height  Bathroom Equipment: Grab bars in shower, Built-in shower seat  Bathroom Accessibility: Walker accessible  Home Equipment: Rolling walker, Crutches, Cane  ADL Assistance: Independent  Homemaking Assistance: Independent  Ambulation Assistance: Independent  Transfer Assistance: Independent  Active : Yes  Mode of Transportation: Car  Occupation: Retired  Leisure & Hobbies: watch 2 grandkids (3 and 7)     Cognition   Cognition  Overall Cognitive Status: WFL    Objective  Strength RLE  Strength RLE: WFL  Strength LLE  Strength LLE: WFL  Motor Control  Gross Motor?: WFL     Bed mobility  Supine to Sit: Independent  Sit to Supine: Independent  Transfers  Sit to Stand: Independent  Stand to sit:  Independent  Ambulation  Ambulation?: Yes  Ambulation 1  Surface: level tile  Device: No Device  Assistance: Independent  Quality of Gait: R knee goes slightly into hyperextension at stance phase  Gait Deviations: Decreased step length;Decreased step height  Distance: 200'  Stairs/Curb  Stairs?: No     Balance  Sitting - Static: Good  Sitting - Dynamic: Good  Standing - Static: Good  Standing - Dynamic: Good        Plan   Safety Devices  Type of devices: Call light within reach, Nurse notified      AM-PAC Score  AM-PAC Inpatient Mobility Raw Score : 24 (09/29/20 1447)  AM-PAC Inpatient T-Scale Score : 61.14 (09/29/20 1447)  Mobility Inpatient CMS 0-100% Score: 0 (09/29/20 1447)  Mobility Inpatient CMS G-Code Modifier : [address] (09/29/20 1447)          Therapy Time   Individual Concurrent Group Co-treatment   Time In 1410         Time Out 1445         Minutes 35         Timed Code Treatment Minutes: 25 Minutes       Donna Jay PT

## 2020-09-29 NOTE — CONSULTS
[address] [place] [address] [address] Neurosurgery Consultation    HPI: Blair Schmitt is a 63 y.o. female patient being seen at the request of Meryle Bale, [id number] for complaints of numbness in the right arm and leg and difficulty walking after falling backward onto her back getting out of the camper last Sunday. No LOC. She experienced a little lower back pain, but could get up. Her hips hurt. The following day she noted some weakness in the RUE at the shoulder, as she tried to put the milk back in the refrigerator, and felt she strained it. She had rt shoulder surgery in the past, but had recovered from that. Since the fall the numbness and weakness on the right side increased gradually and she noted difficulty walking, like her \"right knee might bend backwards. \" She developed tingling in the feet. She did not experience much pain. She was started on steroids yesterday in the ED and today she denies pain in the neck, arms or legs, and currently has numbness only in the hands, R>L. CT imaging of the head, cervical, thoracic and lumbar spine were done. No acute findings/fractures. MRI of the cervical and thoracic spine are also available. Past Medical History:   Diagnosis Date    Arthritis     [facility]     H/O total shoulder replacement, right     Kidney stone      Past Surgical History:   Procedure Laterality Date    CARPAL TUNNEL RELEASE      CHOLECYSTECTOMY      JOINT REPLACEMENT      JOINT REPLACEMENT      bilateral     Patient has no known allergies.     Current Facility-Administered Medications:     melatonin tablet 4.5 mg, 4.5 mg, Oral, Nightly PRN, Iris Bienenstock, APRN - CNP, 4.5 mg at 09/29/20 0047    glucose (GLUTOSE) 40 % oral gel 15 g, 15 g, Oral, PRN, Iris Bienenstock, APRN - CNP    dextrose 50 % IV solution, 12.5 g, Intravenous, PRN, Iris Bienenstock, APRN - CNP    glucagon (rDNA) injection 1 mg, 1 mg, Intramuscular, PRN, Iris Bienenstock, APRN - CNP    dextrose 5 % solution, 100 mL/hr, Intravenous, PRN, Marilyn Lazo APRN - CNP    vancomycin (VANCOCIN) 1500 mg in dextrose 5 % 250 mL IVPB, 1,500 mg, Intravenous, Once, Pedro Luis Gary MD    vancomycin Rah Hush) intermittent dosing (placeholder), , Other, RX Placeholder, Pedro Luis Gary MD    anastrozole (ARIMIDEX) tablet 1 mg, 1 mg, Oral, Daily, Marilyn Lazo APRN - CNP, 1 mg at 09/29/20 0908    atorvastatin (LIPITOR) tablet 20 mg, 20 mg, Oral, Daily, Hodanl Violet, APRN - CNP, 20 mg at 09/29/20 0806    buPROPion (WELLBUTRIN XL) extended release tablet 300 mg, 300 mg, Oral, Daily, Marilyn Lazo APRN - CNP, 300 mg at 09/29/20 7560    DULoxetine (CYMBALTA) extended release capsule 60 mg, 60 mg, Oral, Daily, Marilyn Lazo, APRN - CNP, 60 mg at 09/29/20 8710    gabapentin (NEURONTIN) capsule 300 mg, 300 mg, Oral, BID, Marilyn Lazo APRN - CNP, 300 mg at 09/29/20 0806    propranolol (INDERAL) tablet 80 mg, 80 mg, Oral, Daily, Marilyn Lazo, APRN - CNP, 80 mg at 09/29/20 0805    dexamethasone (DECADRON) injection 4 mg, 4 mg, Intravenous, Q6H, Marilyn Lazo, APRN - CNP, 4 mg at 09/29/20 1146    pantoprazole (PROTONIX) tablet 40 mg, 40 mg, Oral, QAM AC, Marilyn Lazo APRN - CNP, 40 mg at 09/29/20 0546    insulin lispro (HUMALOG) injection vial 0-12 Units, 0-12 Units, Subcutaneous, TID WC, Marilyn Lazo APRN - CNP, 2 Units at 09/29/20 1146    insulin lispro (HUMALOG) injection vial 0-6 Units, 0-6 Units, Subcutaneous, Nightly, Marilyn Lazo APRN - CNP, 1 Units at 09/28/20 2255    sodium chloride flush 0.9 % injection 10 mL, 10 mL, Intravenous, 2 times per day, SHAR Sood - CNP, 10 mL at 09/29/20 0807    sodium chloride flush 0.9 % injection 10 mL, 10 mL, Intravenous, PRN, SHAR Sood CNP    acetaminophen (TYLENOL) tablet 650 mg, 650 mg, Oral, Q6H PRN **OR** acetaminophen (TYLENOL) suppository 650 mg, 650 mg, Rectal, Q6H PRN, SHAR Sood CNP    polyethylene glycol (GLYCOLAX) packet 17 g, 17 g, Oral, Daily PRN, [place] Fill, APRN - CNP    promethazine (PHENERGAN) tablet 12.5 mg, 12.5 mg, Oral, Q6H PRN **OR** ondansetron (ZOFRAN) injection 4 mg, 4 mg, Intravenous, Q6H PRN, [place] Fill, APRN - CNP    enoxaparin (LOVENOX) injection 40 mg, 40 mg, Subcutaneous, Daily, [place] Fill, APRN - CNP, 40 mg at 09/29/20 0805    morphine (PF) injection 2 mg, 2 mg, Intravenous, Q4H PRN, [place] Fill, APRN - CNP, 2 mg at 09/29/20 7604  Social History     Socioeconomic History    Marital status:      Spouse name: Not on file    Number of children: Not on file    Years of education: Not on file    Highest education level: Not on file   Occupational History    Not on file   Social Needs    Financial resource strain: Not on file    Food insecurity     Worry: Not on file     Inability: Not on file   [employer] needs     Medical: Not on file     Non-medical: Not on file   Tobacco Use    Smoking status: Never Smoker    Smokeless tobacco: Never Used   Substance and Sexual Activity    Alcohol use: Yes     Comment: occ    Drug use: Never    Sexual activity: Not on file   Lifestyle    Physical activity     Days per week: Not on file     Minutes per session: Not on file    Stress: Not on file   Relationships    Social connections     Talks on phone: Not on file     Gets together: Not on file     Attends [employer] service: Not on file     Active member of club or organization: Not on file     Attends meetings of clubs or organizations: Not on file     Relationship status: Not on file    Intimate partner violence     Fear of current or ex partner: Not on file     Emotionally abused: Not on file     Physically abused: Not on file     Forced sexual activity: Not on file   Other Topics Concern    Not on file   Social History Narrative    Not on file     History reviewed. No pertinent family history.     ROS: Complete 10 point ROS was obtained with positives in HPI, otherwise negative. /75   Pulse 77   Temp 98.1 °F (36.7 °C) (Oral)   Resp 16   Ht 5' (1.524 m)   Wt 188 lb 7.9 oz (85.5 kg)   SpO2 91%   BMI 36.81 kg/m²     Physical Exam  General: Alert and oriented x3, no acute distress. HEENT: Pupils equal, round and reactive bilaterally, no neck pain or tenderness  Cardiovascular: Regular rate and rhythm, radial pulses present  Respiratory: No cough or recent illness  GI: Soft, non-tender, non-distended,   Musculoskeletal:    Cervical spine: No midline tenderness to palpation, range of motion normal   LUE: No deformities. Range of motion normal.   RUE: No deformities. Range of motion normal.  Skin: No discoloration. Skin turgor normal.  Psychiatric: Mood and affect normal. Demonstrates understanding of situation. Anxious about possibly needing surgery and wearing a cervical collar, which she did not like. Her neck is very short. Neurologic   Neuro exam:  Alert and oriented x4. She is weak in the rt biceps, triceps, 4/5, > deltoid, 4+/5. Rt HG is strong with second effort and equal to the left. Left arm strength is 5/5. Reports pain and tenderness in the rt shoulder. Strength in BLE 5/5. Gait is unsteady and rocking. She feels her rt leg is weak and is bending backward at the knee. Mildly hyperreflexic on the right, and (+) Naldo's on the right only. No clonus. Muscle Examination: Muscle bulk and tone are normal.  I see no fasciculations or atrophy. Gait and Station: Walks with limp, rocking gait.  Rt knee feels weak when walking  Sensation: Sensation is different over the right wrist and hand     Imaging Studies:   MRI cervical spine- severe degenerative changes at C4-5 with moderate stenosis and cor d signal.   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         Severe multilevel degenerative changes, most pronounced at C4-C5 where there    is moderate spinal canal stenosis and moderate bilateral neural foraminal    narrowing, as described above.  There is abnormal increased T2 signal    intensity within the cervical spinal cord suggesting spondylitic myelopathy.         Mild spinal canal stenosis at C3-C4, C5-C6 and C6-C7 as described above.         Multilevel neural foraminal narrowing, most severe on the left at C6-C7. MRI of the thoracic spine: Klippel_Feil and thoracic Johnson rods    1. No acute fracture of the thoracic spine evident. 2. 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 3. Dextroconvex thoracic scoliosis. 4. Mild multilevel spinal canal stenosis within the upper thoracic spine    secondary to multiple disc bulges. Assessment: cervical stenosis with incomplete spinal cord injury after fall. Good improvement with conservative therapies. Plan: discharge to home with steroid taper and start Pt OT. Return to clinic in 4 weeks with report of progress. If not significantly improved then we will discuss a posterior cervical decompression and stabilization. Rationale for plan discussed and patient in agreement.

## 2020-09-29 NOTE — CONSULTS
Patient seen and examined. See fully dictated note. Right sided numbness, weakness, exacerbated after ground level fall and neck massage from family member. Patient has Klippel-Feil and thoracic trejo clemente   MRI c spine shows stenosis at C45 with some signal change in cord. She is on steroids, neck pain resolved and feels somewhat better  Gates on right, hyperrefllexic on right  Moves all 4 extremities. 4+-5/5 strength    Continue decadron  PT OT  She may require decompression and stabilization if she does not improve. She verbalized understanding.

## 2020-09-29 NOTE — PROGRESS NOTES
Patient A&O. Tolerating PO. Patient denies pain at this time. Call light within reach. Will continue to monitor and reassess.  Electronically signed by Ginger Quispe RN on 9/29/2020

## 2020-09-29 NOTE — PROGRESS NOTES
Pharmacy Medication Reconciliation Note     List of medications patient is currently taking is complete. Source of information:   1. Patient  2. EMR    Notes regarding home medications:   1. Patient received all of her morning home medication doses before presenting to the ER. 2. Patient recently finished 10 day course of Bactrim BID for cellulitis on abdomen and face on 9/26.       [address] [facility], Pharmacy Intern  9/28/2020 8:12 PM

## 2020-09-29 NOTE — PROGRESS NOTES
4 Eyes Skin Assessment     NAME:  David Ortega OF BIRTH:  1960  MEDICAL RECORD NUMBER:  [id number]    The patient is being assess for  Admission    I agree that 2 RN's have performed a thorough Head to Toe Skin Assessment on the patient. ALL assessment sites listed below have been assessed. Areas assessed by both nurses:    Head, Face, Ears, Shoulders, Back, Chest, Arms, Elbows, Hands, Sacrum. Buttock, Coccyx, Ischium and Legs. Feet and Heels        Does the Patient have a Wound?  No noted wound(s)       Larry Prevention initiated:  NA   Wound Care Orders initiated:  NA    Pressure Injury (Stage 3,4, Unstageable, DTI, NWPT, and Complex wounds) if present place consult order under [de-identified] NA    New and Established Ostomies if present place consult order under : NA      Nurse 1 eSignature: Electronically signed by Refugio Deutsch RN on 9/29/20 at 1:24 AM EDT    **SHARE this note so that the co-signing nurse is able to place an eSignature**    Nurse 2 eSignature: {Esignature:[id number]}

## 2020-09-29 NOTE — PROGRESS NOTES
Hospitalist Progress Note      PCP: No primary care provider on file. Date of Admission: 9/28/2020    Chief Complaint: Difficulty walking and weakness    Hospital Course:     Subjective: Persistent right upper and lower extremity weakness with difficulty walking. Surgery considering surgical intervention      Medications:  Reviewed    Infusion Medications    dextrose       Scheduled Medications    anastrozole  1 mg Oral Daily    atorvastatin  20 mg Oral Daily    buPROPion  300 mg Oral Daily    DULoxetine  60 mg Oral Daily    gabapentin  300 mg Oral BID    propranolol  80 mg Oral Daily    dexamethasone  4 mg Intravenous Q6H    pantoprazole  40 mg Oral QAM AC    insulin lispro  0-12 Units Subcutaneous TID WC    insulin lispro  0-6 Units Subcutaneous Nightly    sodium chloride flush  10 mL Intravenous 2 times per day    enoxaparin  40 mg Subcutaneous Daily    sulfamethoxazole-trimethoprim  1 tablet Oral 2 times per day     PRN Meds: melatonin, glucose, dextrose, glucagon (rDNA), dextrose, sodium chloride flush, acetaminophen **OR** acetaminophen, polyethylene glycol, promethazine **OR** ondansetron, morphine    No intake or output data in the 24 hours ending 09/29/20 0855    Exam:    /75   Pulse 77   Temp 98.1 °F (36.7 °C) (Oral)   Resp 16   Ht 5' (1.524 m)   Wt 188 lb 7.9 oz (85.5 kg)   SpO2 91%   BMI 36.81 kg/m²     General appearance: No apparent distress, appears stated age and cooperative. HEENT: Pupils equal, round, and reactive to light. Conjunctivae/corneas clear. Neck: Supple, with full range of motion. No jugular venous distention. Trachea midline. Respiratory:  Normal respiratory effort. Clear to auscultation, bilaterally without Rales/Wheezes/Rhonchi. Cardiovascular: Regular rate and rhythm with normal S1/S2 without murmurs, rubs or gallops. Abdomen: Soft, non-tender, non-distended with normal bowel sounds.   Musculoskeletal: No clubbing, cyanosis or edema bilaterally. Full range of motion without deformity, 4-5 strength in right upper and lower extremity  Skin: Skin color, texture, turgor normal.  No rashes or lesions. Neurologic:  Neurovascularly intact without any focal sensory/motor deficits. Cranial nerves: II-XII intact, grossly non-focal.  Psychiatric: Alert and oriented, thought content appropriate, normal insight  Capillary Refill: Brisk,< 3 seconds   Peripheral Pulses: +2 palpable, equal bilaterally       Labs:   Recent Labs     09/28/20  1025 09/29/20  0536   WBC 5.3 6.4   HGB 13.5 12.9   HCT 41.3 38.5    244     Recent Labs     09/28/20  1025 09/29/20  0536    138   K 4.4 4.8    101   CO2 29 25   BUN 27* 32*   CREATININE 1.0 1.1   CALCIUM 9.5 9.5     Recent Labs     09/28/20  1025 09/29/20  0536   AST 27 23   ALT 33 30   BILITOT 0.3 <0.2   ALKPHOS 94 83     No results for input(s): INR in the last 72 hours. No results for input(s): [place] in the last 72 hours. Urinalysis:    No results found for: Easter Fermo, BACTERIA, RBCUA, BLOODU, SPECGRAV, [address]    Radiology:  MRI THORACIC SPINE WO CONTRAST   Final Result   1. No acute fracture of the thoracic spine evident. 2. 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 3. Dextroconvex thoracic scoliosis. 4. Mild multilevel spinal canal stenosis within the upper thoracic spine   secondary to multiple disc bulges. MRI CERVICAL SPINE WO CONTRAST   Final Result   Multiple cervical segmentation and fusion anomalies suggesting Klippel-Feil   spectrum. Severe multilevel degenerative changes, most pronounced at C4-C5 where there   is moderate spinal canal stenosis and moderate bilateral neural foraminal   narrowing, as described above. There is abnormal increased T2 signal   intensity within the cervical spinal cord suggesting spondylitic myelopathy.       Mild spinal canal stenosis at C3-C4, C5-C6 and C6-C7 as described above.      Multilevel neural foraminal narrowing, most severe on the left at C6-C7. XR SHOULDER RIGHT (MIN 2 VIEWS)   Final Result   Satisfactory alignment, right shoulder arthroplasty. CT THORACIC SPINE WO CONTRAST   Final Result   No acute traumatic abnormality identified. Moderate chronic scoliosis with   spinal clemente in place. CT CERVICAL SPINE WO CONTRAST   Final Result   1. No acute fracture. CT LUMBAR SPINE WO CONTRAST   Final Result   Degenerative changes without acute abnormality. Nonobstructing bilateral nephrolithiasis. CT HEAD WO CONTRAST   Final Result   No acute intracranial abnormality. Mild paranasal sinus disease. 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 XXXA] 09/28/2020    Tremors of nervous system [R25.1] 09/28/2020    Arthritis [M19.90] 09/28/2020    Mixed hyperlipidemia [E78.2] 09/28/2020    Cellulitis of abdominal wall [L03.311] 09/28/2020       Spondylitic myeloma  -MRI in ED -   There is abnormal increased T2 signal    intensity within the cervical spinal cord suggesting spondylitic myelopathy.      1. No acute fracture of the thoracic spine evident. 2. 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 3. Dextroconvex thoracic scoliosis. 4. Mild multilevel spinal canal stenosis within the upper thoracic spine    secondary to multiple disc bulges.         Caitlyn Hammond from neurosurgery was consulted: Anticipating potential surgery  -Begin Decadron 4 mg every 6 hours, if Decadron does not help improve symptoms    will likely need surgical intervention  -Keep an eye on magnesium levels  -Begin Protonix per Dr. Chapo Hammond  -Routine neuro checks  -morphine prn for pain     Abscesses with cellulitis  -Appears

## 2020-09-29 NOTE — PROGRESS NOTES
Clinical Pharmacy Note  Vancomycin Consult    Mary Carmen Mathias is a 63 y.o. female ordered Vancomycin for cellulitis; consult received from Dr. Jethro Barrios to manage therapy. Also receiving . Patient Active Problem List   Diagnosis    Spondylolysis    Fall    Tremors of nervous system    Arthritis    Mixed hyperlipidemia    Cellulitis of abdominal wall    Breast cancer ([address]    Dehydration       Allergies:  Patient has no known allergies. Temp max:  Temp (24hrs), Av.1 °F (36.7 °C), Min:98 °F (36.7 °C), Max:98.1 °F (36.7 °C)      Recent Labs     20  1025 20  0536   WBC 5.3 6.4       Recent Labs     20  1025 20  0536   BUN 27* 32*   CREATININE 1.0 1.1       No intake or output data in the 24 hours ending 20 1328    Culture Results:      Ht Readings from Last 1 Encounters:   20 5' (1.524 m)        Wt Readings from Last 1 Encounters:   20 188 lb 7.9 oz (85.5 kg)         Estimated Creatinine Clearance: 54 mL/min (based on SCr of 1.1 mg/dL). Assessment/Plan:  Vanco 1500mg x1 then  Vancomycin 750 mg IV every 18 hours ordered. Regimen projects a trough level of 10-15mg/L. Level ordered for 20 2200. Thank you for the consult.    Electronically signed by Domonique Turcios, [address] [place] [address] on 2020 at 1:29 PM

## 2020-09-29 NOTE — PLAN OF CARE
Problem: Falls - Risk of:  Goal: Will remain free from falls  Description: Will remain free from falls  Outcome: Ongoing  Note: Fall risk assessment completed. Fall precautions in place. Call light within reach. Pt educated on calling for assistance before getting up. Walkway free of clutter. Will continue to monitor. Goal: Absence of physical injury  Description: Absence of physical injury  Outcome: Ongoing     Problem: Pain:  Goal: Pain level will decrease  Description: Pain level will decrease  Outcome: Ongoing  Note: Pt assessed for pain. Pt in pain and assessed with 0-10 pain rating scale. Pt given prescribed analgesic for pain. (See eMar) Pt satisfied with pain relief thus far. Will reassess and continue to monitor.      Goal: Control of acute pain  Description: Control of acute pain  Outcome: Ongoing  Goal: Control of chronic pain  Description: Control of chronic pain  Outcome: Ongoing

## 2020-09-29 NOTE — CARE COORDINATION
INITIAL CASE MANAGEMENT ASSESSMENT    Reviewed chart, met with patient to assess possible discharge needs. Explained Case Management role/services. Living Situation: Patient lives in a two story with spouse, son and daughter-in-law. She is able to stay on the first floor. ADLs: independent      DME: cane, walker--does not use, handicap bathroom--walk in shower, grabs bars, built in shower chair and raised toilet seat    PT/OT Recs: 24/24 on the AM-PAC short mobility form. At this time, no further PT is recommended      Active Services: none     Transportation: active ; family     Medications: takes on her own    PCP: Marjan Stanford APRN-CNP       PLAN/COMMENTS: Patient plans to return home. PATIENT TO HAVE SURGERY AND IS UNSURE OF DC NEEDS. Sw provided home care list.     The Plan for Transition of Care is related to the following treatment goals: home     The Patient and/or patient representative  was provided with a choice of provider and agrees   with the discharge plan. [x] Yes [] No    Freedom of choice list was provided with basic dialogue that supports the patient's individualized plan of care/goals, treatment preferences and shares the quality data associated with the providers. [x] Yes [] No    SW/CM provided contact information for patient or family to call with any questions. SW/CM will follow and assist as needed.     Electronically signed by Lianna Ochoa on 9/29/2020 at 4:17 PM

## 2020-09-29 NOTE — PLAN OF CARE
Problem: Falls - Risk of:  Goal: Will remain free from falls  Description: Will remain free from falls  9/29/2020 0929 by Trang Meyer RN  Outcome: Ongoing     Problem: Falls - Risk of:  Goal: Absence of physical injury  Description: Absence of physical injury  9/29/2020 0929 by Trang Meyer RN  Outcome: Ongoing     Problem: Pain:  Goal: Pain level will decrease  Description: Pain level will decrease  9/29/2020 0929 by Trang Meyer RN  Outcome: Ongoing     Problem: Pain:  Goal: Control of acute pain  Description: Control of acute pain  9/29/2020 0929 by Trang Meyer RN  Outcome: Ongoing     Problem: Pain:  Goal: Control of chronic pain  Description: Control of chronic pain  9/29/2020 0929 by Trang Meyer RN  Outcome: Ongoing

## 2020-09-29 NOTE — PROGRESS NOTES
Dr. Mireya Escobar requested OT/PT eval and treatment to be ordered for patient. See new OT/PT orders. Will continue to monitor and reassess.  Electronically signed by Trang Meyer RN on 9/29/2020

## 2020-09-29 NOTE — ED NOTES
Report called to RYAN Gould at this time. VS as noted. Questions/concerns addressed at this time. No additional concerns voiced. Transport requested at this time.       Kaleb Sigala RN  09/28/20 0239

## 2020-09-29 NOTE — PROGRESS NOTES
Pt arrived to unit at 2215 via stretcher from ED. Pt is alert and oriented X4. Pt oriented to unit and to room. Pt oriented to call light and to phone. White board updated. Pt denies any further needs at this time. Will check on patient Q2 hours.   Electronically signed by Allyson Moreno RN on 9/29/2020 at 1:25 AM

## 2020-09-30 VITALS
OXYGEN SATURATION: 93 % | SYSTOLIC BLOOD PRESSURE: 117 MMHG | RESPIRATION RATE: 16 BRPM | HEART RATE: 69 BPM | HEIGHT: 60 IN | WEIGHT: 189.6 LBS | DIASTOLIC BLOOD PRESSURE: 77 MMHG | TEMPERATURE: 97.7 F | BODY MASS INDEX: 37.22 KG/M2

## 2020-09-30 LAB
A/G RATIO: 1.4 (ref 1.1–2.2)
ALBUMIN SERPL-MCNC: 3.9 G/DL (ref 3.4–5)
ALP BLD-CCNC: 73 U/L (ref 40–129)
ALT SERPL-CCNC: 24 U/L (ref 10–40)
ANION GAP SERPL CALCULATED.3IONS-SCNC: 11 MMOL/L (ref 3–16)
AST SERPL-CCNC: 17 U/L (ref 15–37)
BILIRUB SERPL-MCNC: 0.3 MG/DL (ref 0–1)
BUN BLDV-MCNC: 38 MG/DL (ref 7–20)
CALCIUM SERPL-MCNC: 9.1 MG/DL (ref 8.3–10.6)
CHLORIDE BLD-SCNC: 100 MMOL/L (ref 99–110)
CO2: 24 MMOL/L (ref 21–32)
CREAT SERPL-MCNC: 1 MG/DL (ref 0.6–1.1)
GFR AFRICAN AMERICAN: >60
GFR NON-AFRICAN AMERICAN: 57
GLOBULIN: 2.8 G/DL
GLUCOSE BLD-MCNC: 130 MG/DL (ref 70–99)
GLUCOSE BLD-MCNC: 134 MG/DL (ref 70–99)
HCT VFR BLD CALC: 38.7 % (ref 36–48)
HEMOGLOBIN: 12.7 G/DL (ref 12–16)
MCH RBC QN AUTO: 27.4 PG (ref 26–34)
MCHC RBC AUTO-ENTMCNC: 32.9 G/DL (ref 31–36)
MCV RBC AUTO: 83.4 FL (ref 80–100)
PDW BLD-RTO: 14 % (ref 12.4–15.4)
PERFORMED ON: ABNORMAL
PLATELET # BLD: 263 K/UL (ref 135–450)
PMV BLD AUTO: 8.2 FL (ref 5–10.5)
POTASSIUM REFLEX MAGNESIUM: 4.4 MMOL/L (ref 3.5–5.1)
RBC # BLD: 4.64 M/UL (ref 4–5.2)
SODIUM BLD-SCNC: 135 MMOL/L (ref 136–145)
TOTAL PROTEIN: 6.7 G/DL (ref 6.4–8.2)
WBC # BLD: 18.1 K/UL (ref 4–11)

## 2020-09-30 PROCEDURE — 94760 N-INVAS EAR/PLS OXIMETRY 1: CPT

## 2020-09-30 PROCEDURE — 6360000002 HC RX W HCPCS: Performed by: NURSE PRACTITIONER

## 2020-09-30 PROCEDURE — 6360000002 HC RX W HCPCS: Performed by: FAMILY MEDICINE

## 2020-09-30 PROCEDURE — 80053 COMPREHEN METABOLIC PANEL: CPT

## 2020-09-30 PROCEDURE — 36415 COLL VENOUS BLD VENIPUNCTURE: CPT

## 2020-09-30 PROCEDURE — 85027 COMPLETE CBC AUTOMATED: CPT

## 2020-09-30 PROCEDURE — 2580000003 HC RX 258: Performed by: FAMILY MEDICINE

## 2020-09-30 PROCEDURE — 2580000003 HC RX 258: Performed by: NURSE PRACTITIONER

## 2020-09-30 PROCEDURE — 6370000000 HC RX 637 (ALT 250 FOR IP): Performed by: NURSE PRACTITIONER

## 2020-09-30 RX ORDER — DEXAMETHASONE 2 MG/1
TABLET ORAL
Qty: 36 TABLET | Refills: 0 | Status: SHIPPED | OUTPATIENT
Start: 2020-09-30 | End: 2021-02-20

## 2020-09-30 RX ADMIN — DULOXETINE HYDROCHLORIDE 60 MG: 60 CAPSULE, DELAYED RELEASE ORAL at 09:04

## 2020-09-30 RX ADMIN — ATORVASTATIN CALCIUM 20 MG: 20 TABLET, FILM COATED ORAL at 09:04

## 2020-09-30 RX ADMIN — DEXAMETHASONE SODIUM PHOSPHATE 4 MG: 4 INJECTION, SOLUTION INTRAMUSCULAR; INTRAVENOUS at 06:21

## 2020-09-30 RX ADMIN — PANTOPRAZOLE SODIUM 40 MG: 40 TABLET, DELAYED RELEASE ORAL at 06:21

## 2020-09-30 RX ADMIN — ENOXAPARIN SODIUM 40 MG: 40 INJECTION SUBCUTANEOUS at 09:03

## 2020-09-30 RX ADMIN — VANCOMYCIN HYDROCHLORIDE 750 MG: 750 INJECTION, POWDER, LYOPHILIZED, FOR SOLUTION INTRAVENOUS at 06:21

## 2020-09-30 RX ADMIN — GABAPENTIN 300 MG: 300 CAPSULE ORAL at 09:04

## 2020-09-30 RX ADMIN — PROPRANOLOL HYDROCHLORIDE 80 MG: 40 TABLET ORAL at 09:04

## 2020-09-30 RX ADMIN — DEXAMETHASONE SODIUM PHOSPHATE 4 MG: 4 INJECTION, SOLUTION INTRAMUSCULAR; INTRAVENOUS at 12:40

## 2020-09-30 RX ADMIN — BUPROPION HYDROCHLORIDE 300 MG: 150 TABLET, EXTENDED RELEASE ORAL at 09:04

## 2020-09-30 RX ADMIN — DEXAMETHASONE SODIUM PHOSPHATE 4 MG: 4 INJECTION, SOLUTION INTRAMUSCULAR; INTRAVENOUS at 01:04

## 2020-09-30 RX ADMIN — ANASTROZOLE 1 MG: 1 TABLET ORAL at 09:04

## 2020-09-30 RX ADMIN — SODIUM CHLORIDE, PRESERVATIVE FREE 10 ML: 5 INJECTION INTRAVENOUS at 09:05

## 2020-09-30 ASSESSMENT — PAIN SCALES - GENERAL: PAINLEVEL_OUTOF10: 0

## 2020-09-30 NOTE — PROGRESS NOTES
AAO4, able to make needs known call light in reach. Pain /discomfort being managed with PRN analgesics per MD orders. Patient able to express presence and absence of pain and rate pain appropriately using numerical scale. Vitals signs are stable.   Intake and output are being recorded, will continue to monitor

## 2020-09-30 NOTE — PLAN OF CARE
Problem: Falls - Risk of:  Goal: Will remain free from falls  Description: Will remain free from falls  9/30/2020 1736 by Filipe Preciado RN  Outcome: Completed  9/30/2020 0741 by Filipe Preciado RN  Outcome: Ongoing  Note: Will remain free from falls. Electronically signed by Filipe Preciado RN on 9/30/2020 at 7:41 AM    Goal: Absence of physical injury  Description: Absence of physical injury  9/30/2020 1736 by Filipe Preciado RN  Outcome: Completed  9/30/2020 0741 by Filipe Preciado RN  Outcome: Ongoing  Note: Absence of physical injury. Electronically signed by Filipe Preciado RN on 9/30/2020 at 7:41 AM       Problem: Pain:  Goal: Pain level will decrease  Description: Pain level will decrease  9/30/2020 1736 by Filipe Preciado RN  Outcome: Completed  9/30/2020 0741 by Filipe Preciado RN  Outcome: Ongoing  Note: Pain level will decrease. Electronically signed by Filipe Preciado RN on 9/30/2020 at 7:41 AM    Goal: Control of acute pain  Description: Control of acute pain  9/30/2020 1736 by Filipe Preciado RN  Outcome: Completed  9/30/2020 0741 by Filipe Preciado RN  Outcome: Ongoing  Note: Control of acute pain. Electronically signed by Filipe Preciado RN on 9/30/2020 at 7:42 AM    Goal: Control of chronic pain  Description: Control of chronic pain  9/30/2020 1736 by Filipe Preciado RN  Outcome: Completed  9/30/2020 0741 by Filipe Preciado RN  Outcome: Ongoing  Note: Control of chronic pain.   Electronically signed by Filipe Preciado RN on 9/30/2020 at 7:42 AM

## 2020-09-30 NOTE — PLAN OF CARE
Problem: Falls - Risk of:  Goal: Will remain free from falls  Description: Will remain free from falls  9/30/2020 0741 by Sharyn Rosenberg RN  Outcome: Ongoing  Note: Will remain free from falls. Electronically signed by Sharyn Rosenberg RN on 9/30/2020 at 7:41 AM    9/30/2020 0042 by Dayron Higgins RN  Outcome: Ongoing  Note: Fall risk assessment completed . Fall precautions in place, bed/ chair alarm on, side rails 2/4 up, call light in reach, educated pt on calling for assistance when needed, room clear of clutter. Pt verbalized understanding. Goal: Absence of physical injury  Description: Absence of physical injury  9/30/2020 0741 by Sharyn Rosenberg RN  Outcome: Ongoing  Note: Absence of physical injury. Electronically signed by Sharyn Rosenberg RN on 9/30/2020 at 7:41 AM    9/30/2020 0042 by Dayron Higgins RN  Outcome: Ongoing     Problem: Pain:  Goal: Pain level will decrease  Description: Pain level will decrease  9/30/2020 0741 by Sharyn Rosenberg RN  Outcome: Ongoing  Note: Pain level will decrease. Electronically signed by Sharyn Rosenberg RN on 9/30/2020 at 7:41 AM    9/30/2020 0042 by Dayron Higgins RN  Outcome: Ongoing  Note: Pain /discomfort being managed with PRN analgesics per MD orders. Patient able to express presence and absence of pain and rate pain appropriately using numerical scale. Goal: Control of acute pain  Description: Control of acute pain  9/30/2020 0741 by Sharyn Rosenberg RN  Outcome: Ongoing  Note: Control of acute pain. Electronically signed by Sharyn Rosenberg RN on 9/30/2020 at 7:42 AM    9/30/2020 0042 by Dayron Higgins RN  Outcome: Ongoing  Goal: Control of chronic pain  Description: Control of chronic pain  9/30/2020 0741 by Shrayn Rosenberg RN  Outcome: Ongoing  Note: Control of chronic pain.   Electronically signed by Sharyn Rosenberg RN on 9/30/2020 at 7:42 AM    9/30/2020 0042 by Dayron Higgins RN  Outcome: Ongoing

## 2020-09-30 NOTE — PROGRESS NOTES
Patient seen and examined. Ambulation improved. RUE function improved. On steroids. Discussed options. Agree to discharge to home on steroid taper and proceed with outpatient PT OT. Return to clinic in 4 weeks  If progress plateaus or declines, then we can discuss a cervical decompression and stabiliztion. Understands to contact me if decline. Ok to discharge patient to home.

## 2020-09-30 NOTE — PROGRESS NOTES
Pt being dc'd home, instructions read and understood, iv dc'd abbocath intact.   Electronically signed by Mendel Gamble RN on 9/30/2020 at 5:37 PM

## 2020-09-30 NOTE — CARE COORDINATION
Patient discharged without need for surgery at this time. No home care needed / ordered.   Electronically signed by Agusto Acosta on 9/30/2020 at 4:11 PM

## 2020-09-30 NOTE — PROGRESS NOTES
Subcutaneous Daily     PRN Meds: melatonin, glucose, dextrose, glucagon (rDNA), dextrose, sodium chloride flush, acetaminophen **OR** acetaminophen, polyethylene glycol, promethazine **OR** ondansetron, morphine      Intake/Output Summary (Last 24 hours) at 9/30/2020 0931  Last data filed at 9/29/2020 2300  Gross per 24 hour   Intake 320 ml   Output --   Net 320 ml       Exam:    /85   Pulse 67   Temp 97.8 °F (36.6 °C) (Oral)   Resp 16   Ht 5' (1.524 m)   Wt 189 lb 9.5 oz (86 kg)   SpO2 94%   BMI 37.03 kg/m²     General appearance: No apparent distress, appears stated age and cooperative. HEENT: Pupils equal, round, and reactive to light. Conjunctivae/corneas clear. Neck: Supple, with full range of motion. No jugular venous distention. Trachea midline. Respiratory:  Normal respiratory effort. Clear to auscultation, bilaterally without Rales/Wheezes/Rhonchi. Cardiovascular: Regular rate and rhythm with normal S1/S2 without murmurs, rubs or gallops. Abdomen: Soft, non-tender, non-distended with normal bowel sounds. Musculoskeletal: No clubbing, cyanosis or edema bilaterally. Full range of motion without deformity, 5/5 strength in right upper and lower extremity  Skin: Skin color, texture, turgor normal.  No rashes or lesions. Neurologic:  Neurovascularly intact without any focal sensory/motor deficits.  Cranial nerves: II-XII intact, grossly non-focal.  Psychiatric: Alert and oriented, thought content appropriate, normal insight  Capillary Refill: Brisk,< 3 seconds   Peripheral Pulses: +2 palpable, equal bilaterally       Labs:   Recent Labs     09/28/20  1025 09/29/20  0536 09/30/20  0508   WBC 5.3 6.4 18.1*   HGB 13.5 12.9 12.7   HCT 41.3 38.5 38.7    244 263     Recent Labs     09/28/20  1025 09/29/20  0536 09/30/20  0508    138 135*   K 4.4 4.8 4.4    101 100   CO2 29 25 24   BUN 27* 32* 38*   CREATININE 1.0 1.1 1.0   CALCIUM 9.5 9.5 9.1     Recent Labs     09/28/20  1025 09/29/20  0536 09/30/20  0508   AST 27 23 17   ALT 33 30 24   BILITOT 0.3 <0.2 0.3   ALKPHOS 94 83 73     No results for input(s): INR in the last 72 hours. No results for input(s): Ruthie Rick in the last 72 hours. Urinalysis:    No results found for: Elgie Reels, BACTERIA, RBCUA, BLOODU, SPECGRAV, Sami [place] [address]    Radiology:  MRI THORACIC SPINE WO CONTRAST   Final Result   1. No acute fracture of the thoracic spine evident. 2. 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 3. Dextroconvex thoracic scoliosis. 4. Mild multilevel spinal canal stenosis within the upper thoracic spine   secondary to multiple disc bulges. MRI CERVICAL SPINE WO CONTRAST   Final Result   Multiple cervical segmentation and fusion anomalies suggesting Klippel-Feil   spectrum. Severe multilevel degenerative changes, most pronounced at C4-C5 where there   is moderate spinal canal stenosis and moderate bilateral neural foraminal   narrowing, as described above. There is abnormal increased T2 signal   intensity within the cervical spinal cord suggesting spondylitic myelopathy. Mild spinal canal stenosis at C3-C4, C5-C6 and C6-C7 as described above. Multilevel neural foraminal narrowing, most severe on the left at C6-C7. XR SHOULDER RIGHT (MIN 2 VIEWS)   Final Result   Satisfactory alignment, right shoulder arthroplasty. CT THORACIC SPINE WO CONTRAST   Final Result   No acute traumatic abnormality identified. Moderate chronic scoliosis with   spinal clemente in place. CT CERVICAL SPINE WO CONTRAST   Final Result   1. No acute fracture. CT LUMBAR SPINE WO CONTRAST   Final Result   Degenerative changes without acute abnormality. Nonobstructing bilateral nephrolithiasis. CT HEAD WO CONTRAST   Final Result   No acute intracranial abnormality. Mild paranasal sinus disease.                  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 XXXA] 09/28/2020    Tremors of nervous system [R25.1] 09/28/2020    Arthritis [M19.90] 09/28/2020    Mixed hyperlipidemia [E78.2] 09/28/2020    Cellulitis of abdominal wall [L03.311] 09/28/2020       Cervical Spine stenosis at C4-C5  - significant improvement, neurosurgery recommends outpatient follow up in 4 weeks  - Decadron taper ordered     Abscesses with cellulitis  -Appears to be ingrown hair with surrounding cellulitis, 2 spots on the abdomen.   Treated outpatient with Bactrim improving     Dehydration POA, now resolved  -BUN 27 on admission, increased this am 38  - Azotemia due to Decadron use now  - encourage liquid intake     Chronic Tremors  -takes propanolol  -continue neurontin and welbutrin     Arthritis  -hold home meds, since starting Decadron     Mixed hyperlipidemia  -continue Lipitor     Breast Cancer  -resolved  -continue Arimidex     DVT Prophylaxis: Lovenox  Diet: DIET GENERAL;  Code Status: Full Code      Dispo - Discharge today    Janae Castellanos MD   Hospitalist

## 2020-10-01 NOTE — DISCHARGE SUMMARY
bilateral neural foraminal   narrowing, as described above. There is abnormal increased T2 signal   intensity within the cervical spinal cord suggesting spondylitic myelopathy. Mild spinal canal stenosis at C3-C4, C5-C6 and C6-C7 as described above. Multilevel neural foraminal narrowing, most severe on the left at C6-C7. XR SHOULDER RIGHT (MIN 2 VIEWS)   Final Result   Satisfactory alignment, right shoulder arthroplasty. CT THORACIC SPINE WO CONTRAST   Final Result   No acute traumatic abnormality identified. Moderate chronic scoliosis with   spinal clemente in place. CT CERVICAL SPINE WO CONTRAST   Final Result   1. No acute fracture. CT LUMBAR SPINE WO CONTRAST   Final Result   Degenerative changes without acute abnormality. Nonobstructing bilateral nephrolithiasis. CT HEAD WO CONTRAST   Final Result   No acute intracranial abnormality. Mild paranasal sinus disease.                 Consults:     IP CONSULT TO NEUROSURGERY  PHARMACY TO DOSE VANCOMYCIN    Disposition:  Home     Condition at Discharge: Stable    Discharge Instructions/Follow-up:    Decadron taper, follow up with NS in 4 weeks    Code Status:  Full Code    Activity: activity as tolerated    Diet: regular diet      Discharge Medications:     Discharge Medication List as of 9/30/2020  3:41 PM           Details   dexamethasone (DECADRON) 2 MG tablet 2 tabs po tid for 3 days, then  2 tabs po bid for 3 days, then  1 tab po bid for 2 days, then  1 tab po q day for 2 days, then stop, Disp-36 tablet,R-0Print              Details   buPROPion (WELLBUTRIN XL) 300 MG extended release tablet Take 300 mg by mouth dailyHistorical Med      atorvastatin (LIPITOR) 20 MG tablet Take 20 mg by mouth dailyHistorical Med      celecoxib (CELEBREX) 200 MG capsule Take 200 mg by mouth daily Historical Med      Calcium Carb-Cholecalciferol (CALCIUM-VITAMIN D) 500-200 MG-UNIT per tablet Take 1 tablet by mouth
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
## 2020-10-29 PROBLEM — E86.0 DEHYDRATION: Status: RESOLVED | Noted: 2020-09-29 | Resolved: 2020-10-29

## 2021-02-20 ENCOUNTER — HOSPITAL ENCOUNTER (INPATIENT)
Age: 61
LOS: 2 days | Discharge: HOME | DRG: 690 | End: 2021-02-22
Attending: EMERGENCY MEDICINE | Admitting: HOSPITALIST
Payer: COMMERCIAL

## 2021-02-20 ENCOUNTER — APPOINTMENT (OUTPATIENT)
Dept: CT IMAGING | Age: 61
DRG: 690 | End: 2021-02-20
Payer: COMMERCIAL

## 2021-02-20 DIAGNOSIS — R06.02 SOB (SHORTNESS OF BREATH) ON EXERTION: ICD-10-CM

## 2021-02-20 DIAGNOSIS — N12 PYELONEPHRITIS: Primary | ICD-10-CM

## 2021-02-20 PROBLEM — N10 ACUTE PYELONEPHRITIS: Status: ACTIVE | Noted: 2021-02-20

## 2021-02-20 LAB
A/G RATIO: 1 (ref 1.1–2.2)
ALBUMIN SERPL-MCNC: 3.4 G/DL (ref 3.4–5)
ALP BLD-CCNC: 85 U/L (ref 40–129)
ALT SERPL-CCNC: 23 U/L (ref 10–40)
ANION GAP SERPL CALCULATED.3IONS-SCNC: 9 MMOL/L (ref 3–16)
AST SERPL-CCNC: 19 U/L (ref 15–37)
BACTERIA: ABNORMAL /HPF
BASOPHILS ABSOLUTE: 0 K/UL (ref 0–0.2)
BASOPHILS RELATIVE PERCENT: 0.3 %
BILIRUB SERPL-MCNC: <0.2 MG/DL (ref 0–1)
BILIRUBIN URINE: NEGATIVE
BLOOD, URINE: ABNORMAL
BUN BLDV-MCNC: 20 MG/DL (ref 7–20)
CALCIUM SERPL-MCNC: 9.1 MG/DL (ref 8.3–10.6)
CHLORIDE BLD-SCNC: 99 MMOL/L (ref 99–110)
CLARITY: ABNORMAL
CO2: 27 MMOL/L (ref 21–32)
COLOR: YELLOW
CREAT SERPL-MCNC: 1.1 MG/DL (ref 0.6–1.1)
EOSINOPHILS ABSOLUTE: 0.1 K/UL (ref 0–0.6)
EOSINOPHILS RELATIVE PERCENT: 1 %
EPITHELIAL CELLS, UA: ABNORMAL /HPF (ref 0–5)
GFR AFRICAN AMERICAN: >60
GFR NON-AFRICAN AMERICAN: 51
GLOBULIN: 3.5 G/DL
GLUCOSE BLD-MCNC: 174 MG/DL (ref 70–99)
GLUCOSE URINE: NEGATIVE MG/DL
HCT VFR BLD CALC: 33.1 % (ref 36–48)
HEMOGLOBIN: 10.1 G/DL (ref 12–16)
KETONES, URINE: NEGATIVE MG/DL
LACTIC ACID: 1.2 MMOL/L (ref 0.4–2)
LEUKOCYTE ESTERASE, URINE: ABNORMAL
LYMPHOCYTES ABSOLUTE: 1 K/UL (ref 1–5.1)
LYMPHOCYTES RELATIVE PERCENT: 10.2 %
MCH RBC QN AUTO: 23.5 PG (ref 26–34)
MCHC RBC AUTO-ENTMCNC: 30.4 G/DL (ref 31–36)
MCV RBC AUTO: 77.3 FL (ref 80–100)
MICROSCOPIC EXAMINATION: YES
MONOCYTES ABSOLUTE: 0.9 K/UL (ref 0–1.3)
MONOCYTES RELATIVE PERCENT: 9.5 %
NEUTROPHILS ABSOLUTE: 7.5 K/UL (ref 1.7–7.7)
NEUTROPHILS RELATIVE PERCENT: 79 %
NITRITE, URINE: POSITIVE
PDW BLD-RTO: 14.4 % (ref 12.4–15.4)
PH UA: 6 (ref 5–8)
PLATELET # BLD: 234 K/UL (ref 135–450)
PMV BLD AUTO: 8.7 FL (ref 5–10.5)
POTASSIUM SERPL-SCNC: 4 MMOL/L (ref 3.5–5.1)
PRO-BNP: 1276 PG/ML (ref 0–124)
PROTEIN UA: 30 MG/DL
RBC # BLD: 4.28 M/UL (ref 4–5.2)
RBC UA: ABNORMAL /HPF (ref 0–4)
SODIUM BLD-SCNC: 135 MMOL/L (ref 136–145)
SPECIFIC GRAVITY UA: <=1.005 (ref 1–1.03)
TOTAL PROTEIN: 6.9 G/DL (ref 6.4–8.2)
TROPONIN: <0.01 NG/ML
URINE REFLEX TO CULTURE: YES
URINE TYPE: ABNORMAL
UROBILINOGEN, URINE: 0.2 E.U./DL
WBC # BLD: 9.5 K/UL (ref 4–11)
WBC UA: ABNORMAL /HPF (ref 0–5)

## 2021-02-20 PROCEDURE — U0003 INFECTIOUS AGENT DETECTION BY NUCLEIC ACID (DNA OR RNA); SEVERE ACUTE RESPIRATORY SYNDROME CORONAVIRUS 2 (SARS-COV-2) (CORONAVIRUS DISEASE [COVID-19]), AMPLIFIED PROBE TECHNIQUE, MAKING USE OF HIGH THROUGHPUT TECHNOLOGIES AS DESCRIBED BY CMS-2020-01-R: HCPCS

## 2021-02-20 PROCEDURE — 36415 COLL VENOUS BLD VENIPUNCTURE: CPT

## 2021-02-20 PROCEDURE — 2580000003 HC RX 258: Performed by: HOSPITALIST

## 2021-02-20 PROCEDURE — 6370000000 HC RX 637 (ALT 250 FOR IP): Performed by: HOSPITALIST

## 2021-02-20 PROCEDURE — 6360000002 HC RX W HCPCS: Performed by: EMERGENCY MEDICINE

## 2021-02-20 PROCEDURE — 83605 ASSAY OF LACTIC ACID: CPT

## 2021-02-20 PROCEDURE — 1200000000 HC SEMI PRIVATE

## 2021-02-20 PROCEDURE — 87186 SC STD MICRODIL/AGAR DIL: CPT

## 2021-02-20 PROCEDURE — 6370000000 HC RX 637 (ALT 250 FOR IP): Performed by: EMERGENCY MEDICINE

## 2021-02-20 PROCEDURE — 70450 CT HEAD/BRAIN W/O DYE: CPT

## 2021-02-20 PROCEDURE — 2580000003 HC RX 258: Performed by: EMERGENCY MEDICINE

## 2021-02-20 PROCEDURE — 71275 CT ANGIOGRAPHY CHEST: CPT

## 2021-02-20 PROCEDURE — 80053 COMPREHEN METABOLIC PANEL: CPT

## 2021-02-20 PROCEDURE — 87040 BLOOD CULTURE FOR BACTERIA: CPT

## 2021-02-20 PROCEDURE — 87077 CULTURE AEROBIC IDENTIFY: CPT

## 2021-02-20 PROCEDURE — 6360000004 HC RX CONTRAST MEDICATION: Performed by: EMERGENCY MEDICINE

## 2021-02-20 PROCEDURE — 96365 THER/PROPH/DIAG IV INF INIT: CPT

## 2021-02-20 PROCEDURE — 83880 ASSAY OF NATRIURETIC PEPTIDE: CPT

## 2021-02-20 PROCEDURE — 84484 ASSAY OF TROPONIN QUANT: CPT

## 2021-02-20 PROCEDURE — 85025 COMPLETE CBC W/AUTO DIFF WBC: CPT

## 2021-02-20 PROCEDURE — 87086 URINE CULTURE/COLONY COUNT: CPT

## 2021-02-20 PROCEDURE — 93005 ELECTROCARDIOGRAM TRACING: CPT | Performed by: EMERGENCY MEDICINE

## 2021-02-20 PROCEDURE — 99285 EMERGENCY DEPT VISIT HI MDM: CPT

## 2021-02-20 PROCEDURE — 81001 URINALYSIS AUTO W/SCOPE: CPT

## 2021-02-20 RX ORDER — PROMETHAZINE HYDROCHLORIDE 25 MG/1
12.5 TABLET ORAL EVERY 6 HOURS PRN
Status: DISCONTINUED | OUTPATIENT
Start: 2021-02-20 | End: 2021-02-22 | Stop reason: HOSPADM

## 2021-02-20 RX ORDER — SODIUM CHLORIDE 9 MG/ML
INJECTION, SOLUTION INTRAVENOUS CONTINUOUS
Status: DISCONTINUED | OUTPATIENT
Start: 2021-02-20 | End: 2021-02-21

## 2021-02-20 RX ORDER — SODIUM CHLORIDE 0.9 % (FLUSH) 0.9 %
10 SYRINGE (ML) INJECTION EVERY 12 HOURS SCHEDULED
Status: DISCONTINUED | OUTPATIENT
Start: 2021-02-20 | End: 2021-02-22 | Stop reason: HOSPADM

## 2021-02-20 RX ORDER — ACETAMINOPHEN 325 MG/1
650 TABLET ORAL EVERY 6 HOURS PRN
Status: DISCONTINUED | OUTPATIENT
Start: 2021-02-20 | End: 2021-02-22 | Stop reason: HOSPADM

## 2021-02-20 RX ORDER — ANASTROZOLE 1 MG/1
1 TABLET ORAL DAILY
Status: DISCONTINUED | OUTPATIENT
Start: 2021-02-21 | End: 2021-02-22 | Stop reason: HOSPADM

## 2021-02-20 RX ORDER — ONDANSETRON 2 MG/ML
4 INJECTION INTRAMUSCULAR; INTRAVENOUS EVERY 6 HOURS PRN
Status: DISCONTINUED | OUTPATIENT
Start: 2021-02-20 | End: 2021-02-22 | Stop reason: HOSPADM

## 2021-02-20 RX ORDER — PRIMIDONE 50 MG/1
50 TABLET ORAL EVERY 8 HOURS SCHEDULED
Status: DISCONTINUED | OUTPATIENT
Start: 2021-02-20 | End: 2021-02-21

## 2021-02-20 RX ORDER — 0.9 % SODIUM CHLORIDE 0.9 %
1000 INTRAVENOUS SOLUTION INTRAVENOUS ONCE
Status: COMPLETED | OUTPATIENT
Start: 2021-02-20 | End: 2021-02-20

## 2021-02-20 RX ORDER — ACETAMINOPHEN 650 MG/1
650 SUPPOSITORY RECTAL EVERY 6 HOURS PRN
Status: DISCONTINUED | OUTPATIENT
Start: 2021-02-20 | End: 2021-02-22 | Stop reason: HOSPADM

## 2021-02-20 RX ORDER — IBUPROFEN 400 MG/1
400 TABLET ORAL ONCE
Status: COMPLETED | OUTPATIENT
Start: 2021-02-20 | End: 2021-02-20

## 2021-02-20 RX ORDER — POTASSIUM CHLORIDE 20 MEQ/1
40 TABLET, EXTENDED RELEASE ORAL PRN
Status: DISCONTINUED | OUTPATIENT
Start: 2021-02-20 | End: 2021-02-22 | Stop reason: HOSPADM

## 2021-02-20 RX ORDER — PANTOPRAZOLE SODIUM 40 MG/1
40 TABLET, DELAYED RELEASE ORAL
Status: DISCONTINUED | OUTPATIENT
Start: 2021-02-21 | End: 2021-02-22 | Stop reason: HOSPADM

## 2021-02-20 RX ORDER — GABAPENTIN 300 MG/1
300 CAPSULE ORAL 2 TIMES DAILY
Status: DISCONTINUED | OUTPATIENT
Start: 2021-02-20 | End: 2021-02-22 | Stop reason: HOSPADM

## 2021-02-20 RX ORDER — POTASSIUM CHLORIDE 7.45 MG/ML
10 INJECTION INTRAVENOUS PRN
Status: DISCONTINUED | OUTPATIENT
Start: 2021-02-20 | End: 2021-02-22 | Stop reason: HOSPADM

## 2021-02-20 RX ORDER — PROPRANOLOL HYDROCHLORIDE 40 MG/1
80 TABLET ORAL DAILY
Status: DISCONTINUED | OUTPATIENT
Start: 2021-02-21 | End: 2021-02-22 | Stop reason: HOSPADM

## 2021-02-20 RX ORDER — OYSTER SHELL CALCIUM WITH VITAMIN D 500; 200 MG/1; [IU]/1
1 TABLET, FILM COATED ORAL DAILY
Status: DISCONTINUED | OUTPATIENT
Start: 2021-02-21 | End: 2021-02-22 | Stop reason: HOSPADM

## 2021-02-20 RX ORDER — ATORVASTATIN CALCIUM 20 MG/1
20 TABLET, FILM COATED ORAL DAILY
Status: DISCONTINUED | OUTPATIENT
Start: 2021-02-21 | End: 2021-02-22 | Stop reason: HOSPADM

## 2021-02-20 RX ORDER — SODIUM CHLORIDE 0.9 % (FLUSH) 0.9 %
10 SYRINGE (ML) INJECTION PRN
Status: DISCONTINUED | OUTPATIENT
Start: 2021-02-20 | End: 2021-02-22 | Stop reason: HOSPADM

## 2021-02-20 RX ORDER — DULOXETIN HYDROCHLORIDE 60 MG/1
60 CAPSULE, DELAYED RELEASE ORAL DAILY
Status: DISCONTINUED | OUTPATIENT
Start: 2021-02-21 | End: 2021-02-22 | Stop reason: HOSPADM

## 2021-02-20 RX ORDER — PRIMIDONE 50 MG/1
TABLET ORAL
COMMUNITY
Start: 2021-01-20

## 2021-02-20 RX ORDER — BUPROPION HYDROCHLORIDE 150 MG/1
300 TABLET ORAL DAILY
Status: DISCONTINUED | OUTPATIENT
Start: 2021-02-21 | End: 2021-02-22 | Stop reason: HOSPADM

## 2021-02-20 RX ORDER — M-VIT,TX,IRON,MINS/CALC/FOLIC 27MG-0.4MG
1 TABLET ORAL DAILY
Status: DISCONTINUED | OUTPATIENT
Start: 2021-02-21 | End: 2021-02-22 | Stop reason: HOSPADM

## 2021-02-20 RX ORDER — FAMOTIDINE 20 MG/1
20 TABLET, FILM COATED ORAL 2 TIMES DAILY
Status: DISCONTINUED | OUTPATIENT
Start: 2021-02-20 | End: 2021-02-22 | Stop reason: HOSPADM

## 2021-02-20 RX ORDER — POLYETHYLENE GLYCOL 3350 17 G/17G
17 POWDER, FOR SOLUTION ORAL DAILY PRN
Status: DISCONTINUED | OUTPATIENT
Start: 2021-02-20 | End: 2021-02-22 | Stop reason: HOSPADM

## 2021-02-20 RX ADMIN — ACETAMINOPHEN 650 MG: 325 TABLET ORAL at 23:08

## 2021-02-20 RX ADMIN — SODIUM CHLORIDE 1000 ML: 9 INJECTION, SOLUTION INTRAVENOUS at 14:33

## 2021-02-20 RX ADMIN — PRIMIDONE 50 MG: 50 TABLET ORAL at 21:33

## 2021-02-20 RX ADMIN — Medication 10 ML: at 20:08

## 2021-02-20 RX ADMIN — SODIUM CHLORIDE 1000 ML: 9 INJECTION, SOLUTION INTRAVENOUS at 11:35

## 2021-02-20 RX ADMIN — SODIUM CHLORIDE: 9 INJECTION, SOLUTION INTRAVENOUS at 18:20

## 2021-02-20 RX ADMIN — IOPAMIDOL 100 ML: 755 INJECTION, SOLUTION INTRAVENOUS at 11:26

## 2021-02-20 RX ADMIN — FAMOTIDINE 20 MG: 20 TABLET, FILM COATED ORAL at 20:08

## 2021-02-20 RX ADMIN — CEFTRIAXONE 1000 MG: 1 INJECTION, POWDER, FOR SOLUTION INTRAMUSCULAR; INTRAVENOUS at 14:39

## 2021-02-20 RX ADMIN — IBUPROFEN 400 MG: 400 TABLET, FILM COATED ORAL at 15:14

## 2021-02-20 RX ADMIN — GABAPENTIN 300 MG: 300 CAPSULE ORAL at 20:08

## 2021-02-20 ASSESSMENT — PAIN SCALES - GENERAL
PAINLEVEL_OUTOF10: 3
PAINLEVEL_OUTOF10: 0
PAINLEVEL_OUTOF10: 9
PAINLEVEL_OUTOF10: 0
PAINLEVEL_OUTOF10: 0

## 2021-02-20 ASSESSMENT — PAIN DESCRIPTION - DESCRIPTORS: DESCRIPTORS: ACHING

## 2021-02-20 ASSESSMENT — PAIN DESCRIPTION - LOCATION: LOCATION: HEAD

## 2021-02-20 ASSESSMENT — PAIN DESCRIPTION - PAIN TYPE: TYPE: ACUTE PAIN

## 2021-02-20 NOTE — ED NOTES
Assisted up to BR, urinated, then back to bed, became a little light headed when stood and gets short of breath with walking.      Allayne Cogan, RN  02/20/21 2677

## 2021-02-20 NOTE — ED PROVIDER NOTES
Triage Chief Complaint:   Headache (headache every day since last Sunday, 2/13/21) and Shortness of Breath (short of breath onset Sunday, 2/13/21)    Habematolel:  Stefano Jolly is a 62 y.o. female that presents complaining of shortness of breath, generalized fatigue and a headache which has been present since 2/13. Today is 2/20. She states that for the whole week she has had these symptoms but finally came in for evaluation because \"I felt like I need to get checked out\". No leg swelling no chest pain no fever no worst take of life. ROS:  At least 12 systems reviewed and otherwise acutely negative except as in the [address] Past Medical History:   Diagnosis Date    Arthritis     Cancer ([address]     breast    GERD (gastroesophageal reflux disease)     H/O total shoulder replacement, right     Hyperlipidemia     Kidney stone     Tremor due to disorder of central nervous system      Past Surgical History:   Procedure Laterality Date    BACK SURGERY      fusion C2-T5    CARPAL TUNNEL RELEASE Bilateral     CHOLECYSTECTOMY      JOINT REPLACEMENT Right     shoulder    JOINT REPLACEMENT Bilateral      knees    MASTECTOMY, BILATERAL       History reviewed. No pertinent family history.   Social History     Socioeconomic History    Marital status:      Spouse name: Not on file    Number of children: Not on file    Years of education: Not on file    Highest education level: Not on file   Occupational History    Not on file   Social Needs    Financial resource strain: Not on file    Food insecurity     Worry: Not on file     Inability: Not on file    Transportation needs     Medical: Not on file     Non-medical: Not on file   Tobacco Use    Smoking status: Never Smoker    Smokeless tobacco: Never Used   Substance and Sexual Activity    Alcohol use: Yes     Comment: occ    Drug use: Never    Sexual activity: Not on file   Lifestyle    Physical activity     Days per week: Not on file Minutes per session: Not on file    Stress: Not on file   Relationships    Social connections     Talks on phone: Not on file     Gets together: Not on file     Attends [employer] service: Not on file     Active member of club or organization: Not on file     Attends meetings of clubs or organizations: Not on file     Relationship status: Not on file    Intimate partner violence     Fear of current or ex partner: Not on file     Emotionally abused: Not on file     Physically abused: Not on file     Forced sexual activity: Not on file   Other Topics Concern    Not on file   Social History Narrative    Not on file     Current Facility-Administered Medications   Medication Dose Route Frequency Provider Last Rate Last Admin    0.9 % sodium chloride bolus  1,000 mL Intravenous Once Francoise Vergara MD        iopamidol (ISOVUE-370) 76 % injection 100 mL  100 mL Intravenous ONCE PRN Francoise Vergara MD         Current Outpatient Medications   Medication Sig Dispense Refill    primidone (MYSOLINE) 50 MG tablet 1 po in am and 2 po in pm      buPROPion (WELLBUTRIN XL) 300 MG extended release tablet Take 300 mg by mouth daily      atorvastatin (LIPITOR) 20 MG tablet Take 20 mg by mouth daily      Calcium Carb-Cholecalciferol (CALCIUM-VITAMIN D) 500-200 MG-UNIT per tablet Take 1 tablet by mouth daily      propranolol (INDERAL) 80 MG tablet Take 80 mg by mouth daily      anastrozole (ARIMIDEX) 1 MG tablet Take 1 mg by mouth daily      gabapentin (NEURONTIN) 300 MG capsule Take 300 mg by mouth 2 times daily.       Multiple Vitamins-Minerals (THERAPEUTIC MULTIVITAMIN-MINERALS) tablet Take 1 tablet by mouth daily      omeprazole 20 MG EC tablet Take 20 mg by mouth daily      DULoxetine (CYMBALTA) 60 MG extended release capsule Take 60 mg by mouth daily       Allergies   Allergen Reactions    Fluorouracil Itching     Itching all over body and could not sleep per telephone message 12/15/16    Codeine Itching  Penicillins Itching       [unfilled]    Nursing Notes Reviewed    Physical Exam:  Vitals:    02/20/21 1010   BP: 102/61   Pulse: 80   Resp: 16   Temp: 98.3 °F (36.8 °C)   SpO2: 98%       GENERAL APPEARANCE: Awake and alert. Cooperative. No acute distress. HEAD: Normocephalic. Atraumatic. EYES: EOM's grossly intact. Sclera anicteric. ENT: Mucous membranes are moist. Tolerates saliva. No trismus. NECK: Supple. No meningismus. Trachea midline. HEART: RRR. Radial pulses 2+. LUNGS: Respirations unlabored. CTAB  ABDOMEN: Soft. Non-tender. No guarding or rebound. EXTREMITIES: No acute deformities. NO edema  SKIN: Warm and dry. Nonspecific rash to bilateral breasts without evidence of overt cellulitis, exam w RYAN Wright  NEUROLOGICAL: No gross facial drooping. Moves all 4 extremities spontaneously. PSYCHIATRIC: Normal mood.     I have reviewed and interpreted all of the currently available lab results from this visit (if applicable):  Results for orders placed or performed during the hospital encounter of 02/20/21   CBC Auto Differential   Result Value Ref Range    WBC 9.5 4.0 - 11.0 K/uL    RBC 4.28 4.00 - 5.20 M/uL    Hemoglobin 10.1 (L) 12.0 - 16.0 g/dL    Hematocrit 33.1 (L) 36.0 - 48.0 %    MCV 77.3 (L) 80.0 - 100.0 fL    MCH 23.5 (L) 26.0 - 34.0 pg    MCHC 30.4 (L) 31.0 - 36.0 g/dL    RDW 14.4 12.4 - 15.4 %    Platelets 261 866 - 285 K/uL    MPV 8.7 5.0 - 10.5 fL    Neutrophils % 79.0 %    Lymphocytes % 10.2 %    Monocytes % 9.5 %    Eosinophils % 1.0 %    Basophils % 0.3 %    Neutrophils Absolute 7.5 1.7 - 7.7 K/uL    Lymphocytes Absolute 1.0 1.0 - 5.1 K/uL    Monocytes Absolute 0.9 0.0 - 1.3 K/uL    Eosinophils Absolute 0.1 0.0 - 0.6 K/uL    Basophils Absolute 0.0 0.0 - 0.2 K/uL   Comprehensive Metabolic Panel   Result Value Ref Range    Sodium 135 (L) 136 - 145 mmol/L    Potassium 4.0 3.5 - 5.1 mmol/L    Chloride 99 99 - 110 mmol/L    CO2 27 21 - 32 mmol/L    Anion Gap 9 3 - 16 Glucose 174 (H) 70 - 99 mg/dL    BUN 20 7 - 20 mg/dL    CREATININE 1.1 0.6 - 1.1 mg/dL    GFR Non- 51 (A) >60    GFR African American >60 >60    Calcium 9.1 8.3 - 10.6 mg/dL    Total Protein 6.9 6.4 - 8.2 g/dL    Albumin 3.4 3.4 - 5.0 g/dL    Albumin/Globulin Ratio 1.0 (L) 1.1 - 2.2    Total Bilirubin <0.2 0.0 - 1.0 mg/dL    Alkaline Phosphatase 85 40 - 129 U/L    ALT 23 10 - 40 U/L    AST 19 15 - 37 U/L    Globulin 3.5 g/dL   Troponin   Result Value Ref Range    Troponin <0.01 <0.01 ng/mL   Brain Natriuretic Peptide   Result Value Ref Range    Pro-BNP 1,276 (H) 0 - 124 pg/mL   Lactic Acid, Plasma   Result Value Ref Range    Lactic Acid 1.2 0.4 - 2.0 mmol/L        Radiographs (if obtained):  [] The following radiograph was interpreted by myself in the absence of a radiologist:  [x] Radiologist's Report Reviewed:  Narrative   EXAMINATION:   CTA OF THE CHEST 2/20/2021 11:12 am       TECHNIQUE:   CTA of the chest was performed after the administration of intravenous   contrast.  Multiplanar reformatted images are provided for review.  MIP   images are provided for review.  Dose modulation, iterative reconstruction,   and/or weight based adjustment of the mA/kV was utilized to reduce the   radiation dose to as low as reasonably achievable.       COMPARISON:   None.       HISTORY:   ORDERING SYSTEM PROVIDED HISTORY: sob,  breast ca   TECHNOLOGIST PROVIDED HISTORY:   Reason for exam:->sob,  breast ca   Decision Support Exception->Emergency Medical Condition (MA)   Reason for Exam: SOB FOR 1 WEEK   HX BREAST CANCER 2019   Acuity: Acute   Type of Exam: Initial       FINDINGS:   Pulmonary Arteries: Pulmonary arteries are adequately opacified for   evaluation.  There are streak artifacts related to dense contrast in the   superior vena cava limited evaluation of the adjacent structures.  There is   low-attenuation in the subsegmental pulmonary artery of the right upper lobe, likely related to artifacts (series 3, image 73).  No evidence of   intraluminal filling defect to suggest pulmonary embolism in the central or   proximal segmental pulmonary arteries.  Main pulmonary artery is at the upper   limits of normal in caliber.       Mediastinum: No evidence of mediastinal lymphadenopathy.  The heart and   pericardium demonstrate no acute abnormality.  There is no acute abnormality   of the thoracic aorta.  There are calcified right axillary lymph nodes,   likely related to prior granulomatous disease.       Lungs/pleura: The lungs are without acute process.  No focal consolidation or   pulmonary edema.  No evidence of pleural effusion or pneumothorax.       Upper Abdomen: The patient is status post cholecystectomy. Betha Staple are a few   low-attenuation lesions in the liver with the largest measuring 2.2 cm in the   right lobe of the liver, likely related to cysts or hemangiomas.  There are   patchy areas of decreased enhancement in the left renal cortex, may be   related to pyelonephritis.       Soft Tissues/Bones:  There are postoperative changes in the cervical and   thoracic spine and in the right shoulder.  No acute bone or soft tissue   abnormality.  There are bilateral breast augmentations.           Impression   No evidence of pulmonary embolism in the central or segmental pulmonary   arteries.       No acute pulmonary abnormality.       Patchy areas of decreased enhancement in the left renal cortex, may be   related to pyelonephritis.         Narrative   EXAMINATION:   CT OF THE HEAD WITHOUT CONTRAST  2/20/2021 11:11 am       TECHNIQUE:   CT of the head was performed without the administration of intravenous   contrast. Dose modulation, iterative reconstruction, and/or weight based   adjustment of the mA/kV was utilized to reduce the radiation dose to as low   as reasonably achievable.       COMPARISON:   CT head September 28, 2020       HISTORY: ORDERING SYSTEM PROVIDED HISTORY: HA   TECHNOLOGIST PROVIDED HISTORY:   Reason for exam:->HA   Has a \"code stroke\" or \"stroke alert\" been called? ->No   Decision Support Exception->Emergency Medical Condition (MA)   Reason for Exam: POSTERIOR HEADACHE FOR 1 WEEK [address] [facility] 2019   Acuity: Acute   Type of Exam: Initial       FINDINGS:   BRAIN/VENTRICLES: There is no acute intracranial hemorrhage, mass effect or   midline shift.  No abnormal extra-axial fluid collection.  The gray-white   differentiation is maintained without evidence of an acute infarct.  There is   no evidence of hydrocephalus.       ORBITS: The visualized portion of the orbits demonstrate no acute abnormality.       SINUSES: There is air-fluid level in the left maxillary sinus.  The remainder   of the visualized paranasal sinuses and mastoid air cells demonstrate no   acute abnormality.       SOFT TISSUES/SKULL:  No acute abnormality of the visualized skull or soft   tissues.           Impression   No acute intracranial abnormality.       Left maxillary sinusitis.                 EKG (if obtained): (All EKG's are interpreted by myself in the absence of a cardiologist)  Initial EKG on my interpretation shows normal sinus rhythm with rate of 84 bpm and no ST segment elevation    MDM:  Differential diagnosis: Covid, ACS, PE, cellulitis, sepsis, decompensated heart failure    Covid testing sent out. Dar Hernandez The patient arrives mildly hypotensive IV fluid given. Orthostatics negative. CBC, chemistry, lactic acid unremarkable. EKG and troponin shows no evidence of ACS. The CT of the head shows no abnormalities and she has an NIH of 0 and symptoms have been present for a week. I doubt a neurosurgical emergency is present. The patient has an elevated BNP however there is no clinical evidence of decompensated heart failure and CT of the chest done to rule out PE, as she is short of breath and her lungs are clear this was ordered,  CT done of the chest does show some enhancement of the renal collecting system concerning for pyelonephritis and the urine was done which shows an infectious process. I do believe clinically she has pyelonephritis so after cultures ceftriaxone was started. She still feels very weak and dizzy despite 2 L of IV fluids and remains borderline hypotensive so will admit. Old records reviewed. Labs and imaging reviewed and results discussed with patient. Patient was given scripts for the following medications. I counseled patient how to take these medications. New Prescriptions    No medications on file         CRITICAL CARE TIME   Total Critical Care time was 30 minutes, excluding separately reportable procedures. There was a high probability of clinically significant/life threatening deterioration in the patient's condition which required my urgent intervention.       Clinical Impression:  Pyelonephritis, weakness, hypertension, dizziness  (Please note that portions of this note may have been completed with a voice recognition program. Efforts were made to edit the dictations but occasionally words are mis-transcribed.)    MD Yasmeen Ellison MD  02/20/21 3821

## 2021-02-20 NOTE — PROGRESS NOTES
4 Eyes Skin Assessment     NAME:  Angus Hayden OF BIRTH:  1960  MEDICAL RECORD NUMBER:  [id number]    The patient is being assess for  Admission    I agree that 2 RN's have performed a thorough Head to Toe Skin Assessment on the patient. ALL assessment sites listed below have been assessed. Areas assessed by both nurses:    Head, Face, Ears, Shoulders, Back, Chest, Arms, Elbows, Hands, Sacrum. Buttock, Coccyx, Ischium and Legs. Feet and Heels        Does the Patient have a Wound?  No noted wound(s)       Larry Prevention initiated:  No   Wound Care Orders initiated:  No    Pressure Injury (Stage 3,4, Unstageable, DTI, NWPT, and Complex wounds) if present place consult order under [de-identified] No     New and Established Ostomies if present place consult order under : No      Nurse 1 eSignature: Electronically signed by Julia Martinez RN on 2/20/21 at 6:15 PM EST    **SHARE this note so that the co-signing nurse is able to place an eSignature**    Nurse 2 eSignature: Electronically signed by Marga Lux RN on 2/20/21 at 6:16 PM EST

## 2021-02-20 NOTE — ED NOTES
Up to BR, urinated, back to bed, still shortness of breath on exertion. Rash gone from breasts.      Germaine Hillman RN  02/20/21 6715

## 2021-02-20 NOTE — ED NOTES
Pt. Transported to [facility] via [facility]. Headache is a 3.      Allayne Cogan, RN  02/20/21 9613

## 2021-02-20 NOTE — ED TRIAGE NOTES
Pt. C/o headache and shortness of breath onset Sunday, 2/13/21, taking Tylenol for headache, not helping much.

## 2021-02-20 NOTE — ED NOTES
Rash noted on both breast when attaching EKG leads, pt. Ws not aware that she had a rash, Dr. Deepika Hou made aware.      Meek Johns RN  02/20/21 3058

## 2021-02-20 NOTE — ED NOTES
Up to BR to collect urine sample, unsuccessful, back to bed, given water to drink.      Agapito Diaz RN  02/20/21 1560

## 2021-02-20 NOTE — PLAN OF CARE
Problem: Falls - Risk of:  Goal: Will remain free from falls  Description: Will remain free from falls  Outcome: Ongoing     Problem: Falls - Risk of:  Goal: Absence of physical injury  Description: Absence of physical injury  Outcome: Ongoing     Problem: Safety:  Goal: Free from accidental physical injury  Description: Free from accidental physical injury  Outcome: Ongoing     Problem: Safety:  Goal: Free from intentional harm  Description: Free from intentional harm  Outcome: Ongoing     Problem: Pain:  Goal: Patient's pain/discomfort is manageable  Description: Patient's pain/discomfort is manageable  Outcome: Ongoing

## 2021-02-21 LAB
ANION GAP SERPL CALCULATED.3IONS-SCNC: 13 MMOL/L (ref 3–16)
BUN BLDV-MCNC: 12 MG/DL (ref 7–20)
CALCIUM SERPL-MCNC: 8.8 MG/DL (ref 8.3–10.6)
CHLORIDE BLD-SCNC: 101 MMOL/L (ref 99–110)
CO2: 24 MMOL/L (ref 21–32)
CREAT SERPL-MCNC: 0.7 MG/DL (ref 0.6–1.1)
EKG ATRIAL RATE: 84 BPM
EKG DIAGNOSIS: NORMAL
EKG P AXIS: 63 DEGREES
EKG P-R INTERVAL: 164 MS
EKG Q-T INTERVAL: 360 MS
EKG QRS DURATION: 94 MS
EKG QTC CALCULATION (BAZETT): 425 MS
EKG R AXIS: 59 DEGREES
EKG T AXIS: 41 DEGREES
EKG VENTRICULAR RATE: 84 BPM
GFR AFRICAN AMERICAN: >60
GFR NON-AFRICAN AMERICAN: >60
GLUCOSE BLD-MCNC: 90 MG/DL (ref 70–99)
HCT VFR BLD CALC: 29.1 % (ref 36–48)
HEMOGLOBIN: 9.2 G/DL (ref 12–16)
MCH RBC QN AUTO: 24.2 PG (ref 26–34)
MCHC RBC AUTO-ENTMCNC: 31.5 G/DL (ref 31–36)
MCV RBC AUTO: 76.6 FL (ref 80–100)
PDW BLD-RTO: 15.4 % (ref 12.4–15.4)
PLATELET # BLD: 204 K/UL (ref 135–450)
PMV BLD AUTO: 8.7 FL (ref 5–10.5)
POTASSIUM REFLEX MAGNESIUM: 3.9 MMOL/L (ref 3.5–5.1)
RBC # BLD: 3.8 M/UL (ref 4–5.2)
SARS-COV-2: NOT DETECTED
SODIUM BLD-SCNC: 138 MMOL/L (ref 136–145)
WBC # BLD: 7.6 K/UL (ref 4–11)

## 2021-02-21 PROCEDURE — 93010 ELECTROCARDIOGRAM REPORT: CPT | Performed by: INTERNAL MEDICINE

## 2021-02-21 PROCEDURE — 6360000002 HC RX W HCPCS: Performed by: HOSPITALIST

## 2021-02-21 PROCEDURE — 36415 COLL VENOUS BLD VENIPUNCTURE: CPT

## 2021-02-21 PROCEDURE — 85027 COMPLETE CBC AUTOMATED: CPT

## 2021-02-21 PROCEDURE — 1200000000 HC SEMI PRIVATE

## 2021-02-21 PROCEDURE — 94761 N-INVAS EAR/PLS OXIMETRY MLT: CPT

## 2021-02-21 PROCEDURE — 6370000000 HC RX 637 (ALT 250 FOR IP): Performed by: HOSPITALIST

## 2021-02-21 PROCEDURE — 2580000003 HC RX 258: Performed by: HOSPITALIST

## 2021-02-21 PROCEDURE — 80048 BASIC METABOLIC PNL TOTAL CA: CPT

## 2021-02-21 PROCEDURE — 2700000000 HC OXYGEN THERAPY PER DAY

## 2021-02-21 RX ORDER — PRIMIDONE 50 MG/1
50 TABLET ORAL 2 TIMES DAILY
Status: DISCONTINUED | OUTPATIENT
Start: 2021-02-21 | End: 2021-02-22 | Stop reason: HOSPADM

## 2021-02-21 RX ORDER — FUROSEMIDE 10 MG/ML
40 INJECTION INTRAMUSCULAR; INTRAVENOUS ONCE
Status: COMPLETED | OUTPATIENT
Start: 2021-02-21 | End: 2021-02-21

## 2021-02-21 RX ADMIN — ATORVASTATIN CALCIUM 20 MG: 20 TABLET, FILM COATED ORAL at 08:56

## 2021-02-21 RX ADMIN — PANTOPRAZOLE SODIUM 40 MG: 40 TABLET, DELAYED RELEASE ORAL at 08:05

## 2021-02-21 RX ADMIN — ACETAMINOPHEN 650 MG: 325 TABLET ORAL at 21:55

## 2021-02-21 RX ADMIN — ACETAMINOPHEN 650 MG: 325 TABLET ORAL at 14:17

## 2021-02-21 RX ADMIN — PRIMIDONE 50 MG: 50 TABLET ORAL at 07:28

## 2021-02-21 RX ADMIN — BUPROPION HYDROCHLORIDE 300 MG: 150 TABLET, EXTENDED RELEASE ORAL at 08:56

## 2021-02-21 RX ADMIN — CALCIUM CARBONATE-VITAMIN D TAB 500 MG-200 UNIT 1 TABLET: 500-200 TAB at 08:57

## 2021-02-21 RX ADMIN — DULOXETINE HYDROCHLORIDE 60 MG: 60 CAPSULE, DELAYED RELEASE ORAL at 08:56

## 2021-02-21 RX ADMIN — GABAPENTIN 300 MG: 300 CAPSULE ORAL at 21:24

## 2021-02-21 RX ADMIN — FAMOTIDINE 20 MG: 20 TABLET, FILM COATED ORAL at 21:24

## 2021-02-21 RX ADMIN — MULTIPLE VITAMINS W/ MINERALS TAB 1 TABLET: TAB at 08:56

## 2021-02-21 RX ADMIN — PROPRANOLOL HYDROCHLORIDE 80 MG: 40 TABLET ORAL at 08:56

## 2021-02-21 RX ADMIN — Medication 10 ML: at 21:25

## 2021-02-21 RX ADMIN — ENOXAPARIN SODIUM 40 MG: 40 INJECTION SUBCUTANEOUS at 08:56

## 2021-02-21 RX ADMIN — GABAPENTIN 300 MG: 300 CAPSULE ORAL at 08:56

## 2021-02-21 RX ADMIN — ANASTROZOLE 1 MG: 1 TABLET ORAL at 08:57

## 2021-02-21 RX ADMIN — FAMOTIDINE 20 MG: 20 TABLET, FILM COATED ORAL at 08:56

## 2021-02-21 RX ADMIN — PRIMIDONE 50 MG: 50 TABLET ORAL at 21:24

## 2021-02-21 RX ADMIN — ACETAMINOPHEN 650 MG: 325 TABLET ORAL at 08:55

## 2021-02-21 RX ADMIN — CEFTRIAXONE 1000 MG: 1 INJECTION, POWDER, FOR SOLUTION INTRAMUSCULAR; INTRAVENOUS at 08:56

## 2021-02-21 RX ADMIN — FUROSEMIDE 40 MG: 10 INJECTION, SOLUTION INTRAVENOUS at 18:15

## 2021-02-21 ASSESSMENT — PAIN SCALES - GENERAL
PAINLEVEL_OUTOF10: 8
PAINLEVEL_OUTOF10: 0
PAINLEVEL_OUTOF10: 3
PAINLEVEL_OUTOF10: 0

## 2021-02-21 ASSESSMENT — PAIN - FUNCTIONAL ASSESSMENT: PAIN_FUNCTIONAL_ASSESSMENT: ACTIVITIES ARE NOT PREVENTED

## 2021-02-21 ASSESSMENT — PAIN DESCRIPTION - DESCRIPTORS: DESCRIPTORS: ACHING

## 2021-02-21 ASSESSMENT — PAIN DESCRIPTION - FREQUENCY
FREQUENCY: INTERMITTENT
FREQUENCY: INTERMITTENT

## 2021-02-21 ASSESSMENT — PAIN DESCRIPTION - LOCATION
LOCATION: HEAD

## 2021-02-21 ASSESSMENT — PAIN DESCRIPTION - PROGRESSION: CLINICAL_PROGRESSION: NOT CHANGED

## 2021-02-21 ASSESSMENT — PAIN DESCRIPTION - PAIN TYPE
TYPE: ACUTE PAIN
TYPE: ACUTE PAIN

## 2021-02-21 ASSESSMENT — PAIN DESCRIPTION - ONSET: ONSET: ON-GOING

## 2021-02-21 NOTE — PROGRESS NOTES
Hospitalist Progress Note  2/21/2021 10:51 AM    PCP: Cyndy Franco MD    [phone]     Date of Admission: 2/20/2021                                                                                                                     HOSPITAL COURSE    Patient demographics:  The patient  Jd Tao is a 62 y.o. female     Significant past medical history:   Patient Active Problem List   Diagnosis    Spondylolysis    Tremors of nervous system    Arthritis    Mixed hyperlipidemia    Cellulitis of abdominal wall    Breast cancer ([facility]    Acute pyelonephritis         Presenting symptoms:  Shortness of breath    Diagnostic workup:      CONSULTS DURING ADMISSION :   IP CONSULT TO HOSPITALIST      Patient was diagnosed with:        Treatment while inpatient:  Pt presented to the emergency room with shortness of breath and generalized fatigue and headache.                                                                                       ----------------------------------------------------------      SUBJECTIVE COMPLAINTS- follow up for Shortness of breath    Diet: DIET GENERAL;      OBJECTIVE:   Patient Active Problem List   Diagnosis    Spondylolysis    Tremors of nervous system    Arthritis    Mixed hyperlipidemia    Cellulitis of abdominal wall    Breast cancer ([facility]    Acute pyelonephritis       Allergies  Fluorouracil, Codeine, and Penicillins    Medications    Scheduled Meds:   anastrozole  1 mg Oral Daily    atorvastatin  20 mg Oral Daily    buPROPion  300 mg Oral Daily    calcium-vitamin D  1 tablet Oral Daily    DULoxetine  60 mg Oral Daily    gabapentin  300 mg Oral BID    therapeutic multivitamin-minerals  1 tablet Oral Daily    pantoprazole  40 mg Oral QAM AC    primidone  50 mg Oral 3 times per day    propranolol  80 mg Oral Daily    sodium chloride flush  10 mL Intravenous 2 times per day    enoxaparin  40 mg Subcutaneous Daily    famotidine  20 mg Oral BID  cefTRIAXone (ROCEPHIN) IV  1,000 mg Intravenous Q24H     Continuous Infusions:   sodium chloride 100 mL/hr at 02/20/21 1820     PRN Meds:  sodium chloride flush, potassium chloride **OR** potassium alternative oral replacement **OR** potassium chloride, promethazine **OR** ondansetron, polyethylene glycol, acetaminophen **OR** acetaminophen    Vitals   Vitals /wt   Patient Vitals for the past 8 hrs:   BP Temp Temp src Pulse Resp SpO2   02/21/21 1025 116/72 98.5 °F (36.9 °C) Oral 73 20 98 %   02/21/21 0715  98.2 °F (36.8 °C) Oral 82 20 98 %        72HR INTAKE/OUTPUT:      Intake/Output Summary (Last 24 hours) at 2/21/2021 1051  Last data filed at 2/21/2021 0926  Gross per 24 hour   Intake 290 ml   Output 260 ml   Net 30 ml       Exam:    Gen:   Alert and oriented ×3  Eyes: PERRL. No sclera icterus. No conjunctival injection. ENT: No discharge. Pharynx clear. External appearance of ears and nose normal.  Neck: Trachea midline. No obvious mass. Resp: No accessory muscle use. No crackles. No wheezes. No rhonchi. CV: Regular rate. Regular rhythm. No murmur or rub. No edema. GI: Non-tender. Non-distended. No hernia. Skin: Warm, dry, normal texture and turgor. Lymph: No cervical LAD. No supraclavicular LAD. M/S: / Ext. No cyanosis. No clubbing. No joint deformity. Neuro: CN 2-12 are intact,  no neurologic deficits noted. PT/INR: No results for input(s): PROTIME, INR in the last 72 hours. APTT: No results for input(s): APTT in the last 72 hours.     CBC:   Recent Labs     02/20/21  1056 02/21/21  0718   WBC 9.5 7.6   HGB 10.1* 9.2*   HCT 33.1* 29.1*   MCV 77.3* 76.6*    204       BMP:   Recent Labs     02/20/21  1056 02/21/21  0718   * 138   K 4.0 3.9   CL 99 101   CO2 27 24   BUN 20 12   CREATININE 1.1 0.7       LIVER PROFILE:   Recent Labs     02/20/21  1056   ALKPHOS 85   AST 19   ALT 23   BILITOT <0.2 No results for input(s): AMYLASE in the last 72 hours. No results for input(s): LIPASE in the last 72 hours. UA:  Recent Labs     02/20/21  1344   WBCUA 21-50*   RBCUA 3-4       TROPONIN:   Recent Labs     02/20/21  1056   TROPONINI <0.01       Lab Results   Component Value Date/Time    URRFLXCULT Yes 02/20/2021 01:44 PM       No results for input(s): TSHREFLEX in the last 72 hours. No components found for: [id number]  POC GLUCOSE:  No results for input(s): POCGLU in the last 72 hours. No results for input(s): LABA1C in the last 72 hours. No results found for: LABA1C      ASSESSMENT AND PLAN  Acute pyelonephritis  Continue patient on ceftriaxone  IV fluids  Urine culture sensitivity   renal US ordered      Hypoxia  Get echocardiogram  Titrate oxygen for saturations greater than or equal to 90%  1 dose of Lasix        Anxiety depression F 41.9  Continue on home medication        COVID-19  Testing is negative        DVT and GI prophylaxis                Code Status: Full Code        Dispo -cc      The patient and / or the family were informed of the results of any tests, a time was given to answer questions, a plan was proposed and they agreed with plan. Amrita Hobbs MD    This note was transcribed using [id number] [employer]. Please disregard any translational errors.

## 2021-02-21 NOTE — PLAN OF CARE
Problem: Falls - Risk of:  Goal: Will remain free from falls  Description: Will remain free from falls  2/20/2021 1938 by Chi Burns RN  Outcome: Ongoing  2/20/2021 1808 by Wojciech Mendoza RN  Outcome: Ongoing  Goal: Absence of physical injury  Description: Absence of physical injury  2/20/2021 1938 by Chi Burns RN  Outcome: Ongoing  2/20/2021 1808 by Wojciech Mendoza RN  Outcome: Ongoing     Problem: Safety:  Goal: Free from accidental physical injury  Description: Free from accidental physical injury  2/20/2021 1938 by Chi Burns RN  Outcome: Ongoing  2/20/2021 1808 by Wojciech Mendoza RN  Outcome: Ongoing  Goal: Free from intentional harm  Description: Free from intentional harm  2/20/2021 1938 by Chi Burns RN  Outcome: Ongoing  2/20/2021 1808 by Wojciech Mendoza RN  Outcome: Ongoing     Problem: Pain:  Goal: Patient's pain/discomfort is manageable  Description: Patient's pain/discomfort is manageable  2/20/2021 1938 by Chi Burns RN  Outcome: Ongoing  2/20/2021 1808 by Wojciech Mendoza RN  Outcome: Ongoing

## 2021-02-21 NOTE — H&P
Hospitalist  History and Physical    Patient:  Charley Richard  MRN: [id number]  PCP: Monserrat Sainz MD    CHIEF COMPLAINT: Shortness of breath      HISTORY OF PRESENT ILLNESS:   The patient Charley Richard is a 62 y. o.female who presented to the emergency room with shortness of breath and generalized fatigue and headache. Patient reports that her symptoms have been going on for 1 week. As the symptoms kept worsening patient decided to come to the emergency room. Patient denies any fever chills no history of nausea vomiting or diarrhea no history of hematemesis or melena no history of urinary symptoms. Past Medical History:        Diagnosis Date    Arthritis     Cancer ([facility] [address]     breast    GERD (gastroesophageal reflux disease)     H/O total shoulder replacement, right     Hyperlipidemia     Kidney stone     Tremor due to disorder of central nervous system        Past Surgical History:        Procedure Laterality Date    BACK SURGERY      fusion C2-T5    BREAST RECONSTRUCTION Bilateral     CARPAL TUNNEL RELEASE Bilateral     CHOLECYSTECTOMY      JOINT REPLACEMENT Right     shoulder    JOINT REPLACEMENT Bilateral      knees    MASTECTOMY, BILATERAL Bilateral        Medications Prior to Admission:    Prior to Admission medications    Medication Sig Start Date End Date Taking?  Authorizing Provider   primidone (MYSOLINE) 50 MG tablet 1 po in am and 2 po in pm 1/20/21  Yes Historical Provider, MD   buPROPion (WELLBUTRIN XL) 300 MG extended release tablet Take 300 mg by mouth daily 9/22/20  Yes Historical Provider, MD   atorvastatin (LIPITOR) 20 MG tablet Take 20 mg by mouth daily 6/10/20  Yes Historical Provider, MD   Calcium Carb-Cholecalciferol (CALCIUM-VITAMIN D) 500-200 MG-UNIT per tablet Take 1 tablet by mouth daily   Yes Historical Provider, MD   propranolol (INDERAL) 80 MG tablet Take 80 mg by mouth daily   Yes Historical Provider, MD anastrozole (ARIMIDEX) 1 MG tablet Take 1 mg by mouth daily 9/12/19  Yes Historical Provider, MD   gabapentin (NEURONTIN) 300 MG capsule Take 300 mg by mouth 2 times daily. 5/5/20  Yes Historical Provider, MD   Multiple Vitamins-Minerals (THERAPEUTIC MULTIVITAMIN-MINERALS) tablet Take 1 tablet by mouth daily   Yes Historical Provider, MD   omeprazole 20 MG EC tablet Take 20 mg by mouth daily 9/15/20  Yes Historical Provider, MD   DULoxetine (CYMBALTA) 60 MG extended release capsule Take 60 mg by mouth daily   Yes Historical Provider, MD       Allergies:  Fluorouracil, Codeine, and Penicillins      Social History:   TOBACCO:   reports that she has never smoked. She has never used smokeless tobacco.  ETOH:   reports current alcohol use. Family History:   Denies family history of renal disease    REVIEW OF SYSTEMS:     patients reported symptoms are in BOLD all other symptoms are negative. CONSTITUTIONAL:      fatigue, fever, chills or night sweats, recent weight gain, recent wt loss, insomnia,  General weakness, poor appetite, muscle aches and pains    HEAD: headache, dizziness    EYES:      blurriness,  double vision, dryness,  discharge, irritation,diplopia    EARS:      hearing loss, vertigo, ear discharge,  Earache. Ringing in the ears. NOSE:      Rhinorrhea, sneezing, epistaxis. Discharge, sinusitis,     MOUTH/THROAT:         sore throat, mouth ulcers, Hoarseness    RESPIRATORY:        Shortness of breath, wheezing,  cough, sputum, hemoptysis, obstructive sleep apnea,    CARDIOVASCULAR :      chest pain, palpitations, dyspnea on exercise, Lower extrimity edema (swelling),     GASTROINTESTINAL:       Dysphagia, Poor appetite,  Nausea, Vomiting, diarrhea, heartburn, abdominal pain. Blood in the stools, hematemesis. Pain with swallowing, constipation    GENITOURINARY:       Urinary frequency, hesitancy,  urgency, Dysuria, hematuria,  Urinary Incontinence. Urinary Retention. GYNECOLOGICAL: vaginal bleeding , vaginal discharge, menopause    MUSCULOSKELETAL:       joint swelling or stiffness, joint pain, muscle pain, balance problems, low back pain. NEUROLOGICAL:      Gait problems. Tremor. Dizziness. Pain and paresthesias, weakness in extremities. Seizures, memory loss    PSYCHLOGICAL:        Anxiety, depression    SKIN :      Rashes ulcers, skin color changes, easy bruisability, lymphadenopathy      Physical Exam:      Vitals: /64   Pulse 77   Temp 98.4 °F (36.9 °C) (Oral)   Resp 18   Ht 5' (1.524 m)   Wt 195 lb 8.8 oz (88.7 kg)   SpO2 98%   BMI 38.19 kg/m²     Gen:          Alert and oriented x3  Eyes: PERRL. No sclera icterus. No conjunctival injection. ENT: No discharge. Pharynx clear. External appearance of ears and nose normal.  Neck: Trachea midline. No obvious mass. Resp: No accessory muscle use. No crackles. No wheezes. No rhonchi. CV: Regular rate. Regular rhythm. No murmur or rub. No edema. GI: Non-tender. Non-distended. No hernia. Skin: Warm, dry, normal texture and turgor. Lymph: No cervical LAD. No supraclavicular LAD. M/S: / Ext. No cyanosis. No clubbing. No joint deformity. Neuro: Moves all four extremities. CN 2-12 tested, no deficits noted. Peripheral pulses and capillary refill is intact. CBC:   Recent Labs     02/20/21  1056   WBC 9.5   HGB 10.1*        BMP:    Recent Labs     02/20/21  1056   *   K 4.0   CL 99   CO2 27   BUN 20   CREATININE 1.1   GLUCOSE 174*     Hepatic:   Recent Labs     02/20/21  1056   AST 19   ALT 23   BILITOT <0.2   ALKPHOS 85     Troponin:   Recent Labs     02/20/21  1056   TROPONINI <0.01     BNP: No results for input(s): BNP in the last 72 hours. INR: No results for input(s): INR in the last 72 hours. No results found for: LABA1C        No results for input(s): CKTOTAL in the last 72 hours.   -----------------------------------------------------------------    CT angiogram of the chest No evidence of pulmonary embolism lungs are clear  Evidence of pyelonephritis on the left side  Assessment / Plan     Acute pyelonephritis  Continue patient on ceftriaxone  IV fluids  Urine culture sensitivity        Anxiety depression F 41.9  Continue on home medication      COVID-19  Testing to rule out      DVT and GI prophylaxis      Full Code      Reggie Sahara POOLE    This note was transcribed using [id number] [employer]. Please disregard any translational errors.

## 2021-02-22 ENCOUNTER — APPOINTMENT (OUTPATIENT)
Dept: ULTRASOUND IMAGING | Age: 61
DRG: 690 | End: 2021-02-22
Payer: COMMERCIAL

## 2021-02-22 VITALS
TEMPERATURE: 98.1 F | SYSTOLIC BLOOD PRESSURE: 132 MMHG | BODY MASS INDEX: 37.53 KG/M2 | DIASTOLIC BLOOD PRESSURE: 85 MMHG | WEIGHT: 191.14 LBS | OXYGEN SATURATION: 92 % | HEART RATE: 79 BPM | HEIGHT: 60 IN | RESPIRATION RATE: 16 BRPM

## 2021-02-22 LAB
ANION GAP SERPL CALCULATED.3IONS-SCNC: 11 MMOL/L (ref 3–16)
BUN BLDV-MCNC: 14 MG/DL (ref 7–20)
CALCIUM SERPL-MCNC: 9.1 MG/DL (ref 8.3–10.6)
CHLORIDE BLD-SCNC: 99 MMOL/L (ref 99–110)
CO2: 28 MMOL/L (ref 21–32)
CREAT SERPL-MCNC: 0.8 MG/DL (ref 0.6–1.1)
GFR AFRICAN AMERICAN: >60
GFR NON-AFRICAN AMERICAN: >60
GLUCOSE BLD-MCNC: 87 MG/DL (ref 70–99)
HCT VFR BLD CALC: 29.1 % (ref 36–48)
HEMOGLOBIN: 9.5 G/DL (ref 12–16)
LV EF: 58 %
LVEF MODALITY: NORMAL
MCH RBC QN AUTO: 24.5 PG (ref 26–34)
MCHC RBC AUTO-ENTMCNC: 32.6 G/DL (ref 31–36)
MCV RBC AUTO: 75.1 FL (ref 80–100)
PDW BLD-RTO: 15.1 % (ref 12.4–15.4)
PLATELET # BLD: 239 K/UL (ref 135–450)
PMV BLD AUTO: 8.4 FL (ref 5–10.5)
POTASSIUM REFLEX MAGNESIUM: 3.7 MMOL/L (ref 3.5–5.1)
RBC # BLD: 3.88 M/UL (ref 4–5.2)
SODIUM BLD-SCNC: 138 MMOL/L (ref 136–145)
WBC # BLD: 6.4 K/UL (ref 4–11)

## 2021-02-22 PROCEDURE — 80048 BASIC METABOLIC PNL TOTAL CA: CPT

## 2021-02-22 PROCEDURE — 2580000003 HC RX 258: Performed by: HOSPITALIST

## 2021-02-22 PROCEDURE — 94760 N-INVAS EAR/PLS OXIMETRY 1: CPT

## 2021-02-22 PROCEDURE — 6370000000 HC RX 637 (ALT 250 FOR IP): Performed by: HOSPITALIST

## 2021-02-22 PROCEDURE — 36415 COLL VENOUS BLD VENIPUNCTURE: CPT

## 2021-02-22 PROCEDURE — 85027 COMPLETE CBC AUTOMATED: CPT

## 2021-02-22 PROCEDURE — 76770 US EXAM ABDO BACK WALL COMP: CPT

## 2021-02-22 PROCEDURE — 6360000002 HC RX W HCPCS: Performed by: HOSPITALIST

## 2021-02-22 PROCEDURE — 93306 TTE W/DOPPLER COMPLETE: CPT

## 2021-02-22 RX ORDER — FUROSEMIDE 20 MG/1
20 TABLET ORAL DAILY PRN
Qty: 60 TABLET | Refills: 0 | Status: SHIPPED | OUTPATIENT
Start: 2021-02-22

## 2021-02-22 RX ADMIN — FAMOTIDINE 20 MG: 20 TABLET, FILM COATED ORAL at 09:13

## 2021-02-22 RX ADMIN — ACETAMINOPHEN 650 MG: 325 TABLET ORAL at 05:09

## 2021-02-22 RX ADMIN — PROPRANOLOL HYDROCHLORIDE 80 MG: 40 TABLET ORAL at 09:13

## 2021-02-22 RX ADMIN — ENOXAPARIN SODIUM 40 MG: 40 INJECTION SUBCUTANEOUS at 09:14

## 2021-02-22 RX ADMIN — ANASTROZOLE 1 MG: 1 TABLET ORAL at 09:19

## 2021-02-22 RX ADMIN — DULOXETINE HYDROCHLORIDE 60 MG: 60 CAPSULE, DELAYED RELEASE ORAL at 09:14

## 2021-02-22 RX ADMIN — PANTOPRAZOLE SODIUM 40 MG: 40 TABLET, DELAYED RELEASE ORAL at 05:09

## 2021-02-22 RX ADMIN — GABAPENTIN 300 MG: 300 CAPSULE ORAL at 09:14

## 2021-02-22 RX ADMIN — CALCIUM CARBONATE-VITAMIN D TAB 500 MG-200 UNIT 1 TABLET: 500-200 TAB at 09:13

## 2021-02-22 RX ADMIN — BUPROPION HYDROCHLORIDE 300 MG: 150 TABLET, EXTENDED RELEASE ORAL at 09:14

## 2021-02-22 RX ADMIN — Medication 10 ML: at 09:14

## 2021-02-22 RX ADMIN — CEFTRIAXONE 1000 MG: 1 INJECTION, POWDER, FOR SOLUTION INTRAMUSCULAR; INTRAVENOUS at 09:13

## 2021-02-22 RX ADMIN — MULTIPLE VITAMINS W/ MINERALS TAB 1 TABLET: TAB at 09:14

## 2021-02-22 RX ADMIN — ATORVASTATIN CALCIUM 20 MG: 20 TABLET, FILM COATED ORAL at 09:14

## 2021-02-22 RX ADMIN — PRIMIDONE 50 MG: 50 TABLET ORAL at 09:13

## 2021-02-22 ASSESSMENT — PAIN SCALES - GENERAL
PAINLEVEL_OUTOF10: 0
PAINLEVEL_OUTOF10: 9

## 2021-02-22 NOTE — PLAN OF CARE
Problem: Falls - Risk of:  Goal: Will remain free from falls  Description: Will remain free from falls  2/22/2021 0147 by Serafin Sales RN  Outcome: Ongoing  2/21/2021 1250 by Leonor Ansari RN  Outcome: Ongoing  Goal: Absence of physical injury  Description: Absence of physical injury  2/22/2021 0147 by Serafin Sales RN  Outcome: Ongoing  2/21/2021 1250 by Leonor Ansari RN  Outcome: Ongoing     Problem: Safety:  Goal: Free from accidental physical injury  Description: Free from accidental physical injury  2/22/2021 0147 by Serafin Sales RN  Outcome: Ongoing  2/21/2021 1250 by Leonor Ansari RN  Outcome: Ongoing  Goal: Free from intentional harm  Description: Free from intentional harm  2/22/2021 0147 by Serafin Sales RN  Outcome: Ongoing  2/21/2021 1250 by Leonor Ansari RN  Outcome: Ongoing     Problem: Pain:  Goal: Patient's pain/discomfort is manageable  Description: Patient's pain/discomfort is manageable  2/22/2021 0147 by Serafin Sales RN  Outcome: Ongoing  2/21/2021 1250 by Leonor Ansari RN  Outcome: Ongoing

## 2021-02-22 NOTE — PROGRESS NOTES
Pt is ready for d/c, all education discussed and agreeable with pt. IV removed without complications. Pt VSS on room air and A+Ox4.  to transport pt. Will wait on ride to d/cv.      Electronically signed by Marla Mane RN on 2/22/2021 at 5:53 PM

## 2021-02-22 NOTE — PROGRESS NOTES
per cmu pt just had 32 beats of V-Tach, pt states she feels fine does not have a cardiac hx, there is orders for echo later today. Physician on call notified, orders for BMP\ Reflex Stat.   pt is asymptomatic and vs stable.

## 2021-02-22 NOTE — CARE COORDINATION
INITIAL CASE MANAGEMENT ASSESSMENT    Reviewed chart, met with patient to assess possible discharge needs. Explained Case Management role/services. Living Situation: confirmed address, lives with her , son and daughter in law     ADLs: independent      DME: cane, walker - does not use    PT/OT Recs: n/a      Active Services: none reported     Transportation: active , family to transport     Medications: Pharmacy: Retail, no issues     PCP: confirmed Piter Arizmendi MD     PLAN/COMMENTS: plan is home with family, follow for needs    SW/CM provided contact information for patient or family to call with any questions. SW/CM will follow and assist as needed.     LAURI Fernandez-S, Social Work  [phone]    Electronically signed by YVETTE Cuellar LSW on 2/22/2021 at 3:45 PM

## 2021-02-22 NOTE — DISCHARGE SUMMARY
Hospital Medicine Discharge Summary    Patient ID: Charley Richard      Patient's PCP: Monserrat Sainz MD    Admit Date: 2/20/2021     Discharge Date:   2/22/21    Admitting Physician: Kylee Salas MD     Discharge Physician: Janay Haile MD     Discharge Diagnoses: Active Hospital Problems    Diagnosis Date Noted    Acute pyelonephritis [N10] 02/20/2021       The patient was seen and examined on day of discharge and this discharge summary is in conjunction with any daily progress note from day of discharge.     Hospital Course: Patient is a 52-year-old female with past medical history of breast cancer, hyperlipidemia, essential tremor who presented to the hospital with 1 week history of shortness of breath along with generalized fatigue and headache. In the emergency department she was found to have a urinary tract infection with concern for acute pyelonephritis. She was started on IV fluids along with IV antibiotics. CT of the head in emergency department was negative for any acute intracranial pathology. CT of the chest was negative for any pulmonary embolism or infectious process. Urinary culture came back positive for E. coli which is similar to her previous to outside cultures which were both pansensitive. Patient was placed on Rocephin IV for a total of 3 days. Echocardiogram was performed and showed a normal ejection fraction but did show an elevated pulmonary artery pressure. Renal ultrasound was negative for any acute infectious process and/or abnormality. Patient was diuresed with a one-time dose of Lasix and should be close to 3 L of fluid. Patient is stable for discharge home and follow-up with her primary care provider. Due to the patient's recurrent urinary tract infections she was advised to follow-up for possible emptying studies. She did have a conversation with her oncologist regarding starting oxybutynin and this might be a good medication to start due to her recurrent urinary tract infections. Patient also concerned about her shortness of breath. I did give her Lasix as needed at discharge but informed her she needs to follow-up with her PCP in regards to formal PFTs along with possible sleep study.       Exam:     /85   Pulse 79   Temp 98.1 °F (36.7 °C) (Oral)   Resp 16   Ht 5' (1.524 m)   Wt 191 lb 2.2 oz (86.7 kg)   SpO2 92%   BMI 37.33 kg/m²     General appearance: No apparent distress, appears stated age and cooperative, obese HEENT: Pupils equal, round, and reactive to light. Conjunctivae/corneas clear. Neck: Supple, with full range of motion. No jugular venous distention. Trachea midline. Respiratory:  Normal respiratory effort. Clear to auscultation, bilaterally without Rales/Wheezes/Rhonchi. Cardiovascular: Regular rate and rhythm with normal S1/S2 without murmurs, rubs or gallops. Abdomen: Soft, non-tender, non-distended with normal bowel sounds. Musculoskeletal: No clubbing, cyanosis or edema bilaterally. Full range of motion without deformity. Skin: Skin color, texture, turgor normal.  No rashes or lesions. Neurologic:  Neurovascularly intact without any focal sensory/motor deficits. Cranial nerves: II-XII intact, grossly non-focal.  Psychiatric: Alert and oriented, thought content appropriate, normal insight      Consults:     IP CONSULT TO HOSPITALIST    Significant Diagnostic Studies:     US RENAL COMPLETE   Final Result   Unremarkable ultrasound of the kidneys and urinary bladder. No   hydronephrosis. CTA PULMONARY W CONTRAST   Final Result   No evidence of pulmonary embolism in the central or segmental pulmonary   arteries. No acute pulmonary abnormality. Patchy areas of decreased enhancement in the left renal cortex, may be   related to pyelonephritis. CT HEAD WO CONTRAST   Final Result   No acute intracranial abnormality. Left maxillary sinusitis. Disposition:  Home     Condition at Discharge: Stable    Discharge Instructions/Follow-up:  PCP    Code Status:  Full Code     Activity: activity as tolerated    Diet: regular diet      Labs:  For convenience and continuity at follow-up the following most recent labs are provided:      CBC:    Lab Results   Component Value Date    WBC 6.4 02/22/2021    HGB 9.5 02/22/2021    HCT 29.1 02/22/2021     02/22/2021       Renal:    Lab Results   Component Value Date     02/22/2021    K 3.7 02/22/2021    CL 99 02/22/2021 CO2 28 02/22/2021    BUN 14 02/22/2021    CREATININE 0.8 02/22/2021    CALCIUM 9.1 02/22/2021       Discharge Medications:     Current Discharge Medication List           Details   furosemide (LASIX) 20 MG tablet Take 1 tablet by mouth daily as needed (SOB)  Qty: 60 tablet, Refills: 0              Details   primidone (MYSOLINE) 50 MG tablet 1 po in am and 2 po in pm      atorvastatin (LIPITOR) 20 MG tablet Take 20 mg by mouth daily      Calcium Carb-Cholecalciferol (CALCIUM-VITAMIN D) 500-200 MG-UNIT per tablet Take 1 tablet by mouth daily      anastrozole (ARIMIDEX) 1 MG tablet Take 1 mg by mouth daily      gabapentin (NEURONTIN) 300 MG capsule Take 300 mg by mouth 2 times daily. Multiple Vitamins-Minerals (THERAPEUTIC MULTIVITAMIN-MINERALS) tablet Take 1 tablet by mouth daily      omeprazole 20 MG EC tablet Take 20 mg by mouth daily      DULoxetine (CYMBALTA) 60 MG extended release capsule Take 60 mg by mouth daily      buPROPion (WELLBUTRIN XL) 300 MG extended release tablet Take 300 mg by mouth daily      propranolol (INDERAL) 80 MG tablet Take 80 mg by mouth daily             No future appointments. Time Spent on discharge is more than 30 minutes in the examination, evaluation, counseling and review of medications and discharge plan. Signed:    Verdis Leyden, MD   2/22/2021      Thank you Naida Johansen MD for the opportunity to be involved in this patient's care. If you have any questions or concerns please feel free to contact me at [phone].

## 2021-02-22 NOTE — PLAN OF CARE
Problem: Falls - Risk of:  Goal: Will remain free from falls  Description: Will remain free from falls  2/22/2021 1017 by Jesus Gaitan RN  Outcome: Ongoing  Note: Fall risk preventions in place. Bed in lowest position, call light within reach, non-skid socks on when ambulating, bed alarm on, bed wheels locked. Pt. Educated and agreeable on usage of call light before ambulation. Problem: Falls - Risk of:  Goal: Absence of physical injury  Description: Absence of physical injury  2/22/2021 1017 by Jesus Gaitan RN  Outcome: Ongoing     Problem: Safety:  Goal: Free from accidental physical injury  Description: Free from accidental physical injury  2/22/2021 1017 by Jesus Gaitan RN  Outcome: Ongoing     Problem: Safety:  Goal: Free from intentional harm  Description: Free from intentional harm  2/22/2021 1017 by Jesus Gaitan RN  Outcome: Ongoing     Problem: Pain:  Goal: Patient's pain/discomfort is manageable  Description: Patient's pain/discomfort is manageable  2/22/2021 1017 by Jesus Gaitan RN  Outcome: Ongoing  Note:   Will continue to use the pain scale (0-10) to measure pt's pain and monitor their needs. Will assess patients pain with assessments and interactions. Pt will notify RN of any changes in pain and where. Will continue to perform therapeutic comfort measures and give pain medications as prescribed/needed.

## 2021-02-23 LAB
ORGANISM: ABNORMAL
URINE CULTURE, ROUTINE: ABNORMAL

## 2021-02-25 LAB — BLOOD CULTURE, ROUTINE: NORMAL

## 2022-01-01 NOTE — ED NOTES
Pt off the floor to MRI     Omayra [phone]  09/28/20 9112 Site: Forehead (27 Jun 2022 06:00)  Bilirubin: 5.6 (27 Jun 2022 06:00)

## 2023-12-01 NOTE — ED NOTES
Addended by: BAY PATE on: 12/1/2023 09:35 AM     Modules accepted: Orders     Ltanya Mems ordered per patient request      Ani Hernandez RN  09/28/20 0505

## 2025-04-21 ENCOUNTER — PROCEDURE VISIT (OUTPATIENT)
Dept: SURGERY | Age: 65
End: 2025-04-21
Payer: COMMERCIAL

## 2025-04-21 VITALS — DIASTOLIC BLOOD PRESSURE: 88 MMHG | SYSTOLIC BLOOD PRESSURE: 133 MMHG | HEART RATE: 69 BPM

## 2025-04-21 DIAGNOSIS — C44.01 BASAL CELL CARCINOMA (BCC) OF SKIN OF LEFT UPPER LIP: Primary | ICD-10-CM

## 2025-04-21 PROCEDURE — 17311 MOHS 1 STAGE H/N/HF/G: CPT | Performed by: DERMATOLOGY

## 2025-04-21 PROCEDURE — 12051 INTMD RPR FACE/MM 2.5 CM/<: CPT | Performed by: DERMATOLOGY

## 2025-04-21 RX ORDER — HYDROXYZINE HYDROCHLORIDE 25 MG/1
TABLET, FILM COATED ORAL
COMMUNITY
Start: 2025-04-10

## 2025-04-21 RX ORDER — FERROUS SULFATE 325(65) MG
325 TABLET, DELAYED RELEASE (ENTERIC COATED) ORAL DAILY
COMMUNITY
Start: 2024-06-13 | End: 2025-06-13

## 2025-04-21 RX ORDER — CELECOXIB 200 MG/1
CAPSULE ORAL
COMMUNITY
Start: 2025-01-17

## 2025-04-21 RX ORDER — TRIAMCINOLONE ACETONIDE 1 MG/G
CREAM TOPICAL
COMMUNITY
Start: 2025-04-10

## 2025-04-21 NOTE — PROGRESS NOTES
MOHS PROCEDURE NOTE    PHYSICIAN:  Sammi Spencer MD, Who operated in two distinct and integrated capacities as the surgeon removing the tissue and as the pathologist examining the tissue.    ASSISTANT: Christiano Rawls RN     REFERRING PROVIDER:  Brittny Rhodes MD    PREOPERATIVE DIAGNOSIS: Nodular Basal Cell Carcinoma     SPECIFIC MOHS INDICATIONS:  location and need for tissue conservation    AUC SCORIN/9    POSTOPERATIVE DIAGNOSIS: SAME    LOCATION: Left Superolateral Cutaneous Upper Lip    OPERATIVE PROCEDURE:  MOHS MICROGRAPHIC SURGERY    RECONSTRUCTION OF DEFECT: Intermediate layered closure    PREOPERATIVE SIZE: 5 x 4 MM    DEFECT SIZE: 8 x 7 MM    LENGTH OF REPAIRED WOUND/SIZE OF FLAP/SIZE OF GRAFT:  22 MM    ANESTHESIA:  8 mL 1% lidocaine with epinephrine 1:100,000 buffered.     EBL:  MINIMAL    DURATION OF PROCEDURE:  1 HOUR    POSTOPERATIVE OBSERVATION: 38 MIN    SPECIMENS:  SEE MOHS MAP    COMPLICATIONS:  NONE    DESCRIPTION OF PROCEDURE:  The patient was given a mirror, as appropriate, and the biopsy site was identified, marked with a surgical marking pen, and verified by the patient.   Options for treatment were discussed and the patient was informed that Mohs surgery was the selected treatment based on its lower recurrence rate, given the features listed above, as compared to other treatment modalities such as excision, radiation, or curettage, and agreed with this treatment plan.  Risks and benefits including bruising, swelling, bleeding, infection, nerve injury, recurrence, and scarring were discussed with the patient prior to the procedure and a written consent detailing these and other risks was reviewed with the patient and signed.    There was a time out for person and procedure verification.  The surgical site was prepped with an antiseptic solution.  Application of an antiseptic solution was repeated before each surgical stage.      Stage I:  The clinically-apparent tumor was

## 2025-04-21 NOTE — PROGRESS NOTES
PRE-PROCEDURE SCREENING    Pacemaker/ICD: No  Difficulty with numbing in the past: No  Local Anesthesia Reaction/passing out: No  Latex or adhesive allergy:  No  Any history of reaction to suture or skin glue:  no  Bleeding/Clotting Disorders: No  Anticoagulant Therapy: No  Joint prosthesis: Yes, bilateral shoulders and knees  Artificial Heart Valve: No  Stroke or Seizures: No  Organ Transplant or Lymphoma: No  Immunosuppression: No  Respiratory Problems: No

## 2025-04-22 ENCOUNTER — TELEPHONE (OUTPATIENT)
Dept: SURGERY | Age: 65
End: 2025-04-22

## 2025-04-22 NOTE — TELEPHONE ENCOUNTER
The patient was in the office on 4/21/2025 for mohs located on the left superolateral cutaneous upper lip with ILC repair.  The patient tolerated the procedure well and left the office in good condition.    Pain level on post-operative day 1:  none    Any bleeding episode that required pressure to be held, bandage change or a call to the office or MD?  no     Any other issues?:  no    A post-operative telephone call was placed at 4:00pm in order to check on the patient's recovery process.  The patient reported doing well and had no complaints other than those listed above, if any.  All of the patient's questions were answered.